# Patient Record
Sex: FEMALE | Race: BLACK OR AFRICAN AMERICAN | NOT HISPANIC OR LATINO
[De-identification: names, ages, dates, MRNs, and addresses within clinical notes are randomized per-mention and may not be internally consistent; named-entity substitution may affect disease eponyms.]

---

## 2017-02-17 ENCOUNTER — MEDICATION RENEWAL (OUTPATIENT)
Age: 62
End: 2017-02-17

## 2017-03-08 ENCOUNTER — MEDICATION RENEWAL (OUTPATIENT)
Age: 62
End: 2017-03-08

## 2017-03-11 ENCOUNTER — APPOINTMENT (OUTPATIENT)
Dept: INTERNAL MEDICINE | Facility: CLINIC | Age: 62
End: 2017-03-11

## 2017-06-12 ENCOUNTER — MEDICATION RENEWAL (OUTPATIENT)
Age: 62
End: 2017-06-12

## 2017-07-15 ENCOUNTER — APPOINTMENT (OUTPATIENT)
Dept: INTERNAL MEDICINE | Facility: CLINIC | Age: 62
End: 2017-07-15

## 2017-07-15 VITALS
WEIGHT: 143 LBS | BODY MASS INDEX: 28.07 KG/M2 | DIASTOLIC BLOOD PRESSURE: 100 MMHG | TEMPERATURE: 98.8 F | HEIGHT: 60 IN | SYSTOLIC BLOOD PRESSURE: 160 MMHG

## 2017-07-17 LAB
ALBUMIN SERPL ELPH-MCNC: 4.6 G/DL
ALP BLD-CCNC: 65 U/L
ALT SERPL-CCNC: 14 U/L
ANION GAP SERPL CALC-SCNC: 18 MMOL/L
AST SERPL-CCNC: 19 U/L
BILIRUB SERPL-MCNC: 0.2 MG/DL
BUN SERPL-MCNC: 11 MG/DL
CALCIUM SERPL-MCNC: 10.1 MG/DL
CHLORIDE SERPL-SCNC: 100 MMOL/L
CO2 SERPL-SCNC: 23 MMOL/L
CREAT SERPL-MCNC: 0.88 MG/DL
GLUCOSE SERPL-MCNC: 94 MG/DL
HBA1C MFR BLD HPLC: 5.8 %
HCV AB SER QL: NONREACTIVE
HCV S/CO RATIO: 0.19 S/CO
POTASSIUM SERPL-SCNC: 4.7 MMOL/L
PROT SERPL-MCNC: 7.9 G/DL
SODIUM SERPL-SCNC: 141 MMOL/L

## 2017-07-23 ENCOUNTER — FORM ENCOUNTER (OUTPATIENT)
Age: 62
End: 2017-07-23

## 2017-07-24 ENCOUNTER — APPOINTMENT (OUTPATIENT)
Dept: MAMMOGRAPHY | Facility: IMAGING CENTER | Age: 62
End: 2017-07-24

## 2017-07-24 ENCOUNTER — OUTPATIENT (OUTPATIENT)
Dept: OUTPATIENT SERVICES | Facility: HOSPITAL | Age: 62
LOS: 1 days | End: 2017-07-24
Payer: MEDICARE

## 2017-07-24 DIAGNOSIS — Z00.00 ENCOUNTER FOR GENERAL ADULT MEDICAL EXAMINATION WITHOUT ABNORMAL FINDINGS: ICD-10-CM

## 2017-07-24 PROCEDURE — 77063 BREAST TOMOSYNTHESIS BI: CPT

## 2017-07-24 PROCEDURE — 77067 SCR MAMMO BI INCL CAD: CPT

## 2017-07-26 ENCOUNTER — FORM ENCOUNTER (OUTPATIENT)
Age: 62
End: 2017-07-26

## 2017-07-27 ENCOUNTER — APPOINTMENT (OUTPATIENT)
Dept: MAMMOGRAPHY | Facility: IMAGING CENTER | Age: 62
End: 2017-07-27
Payer: MEDICARE

## 2017-07-27 ENCOUNTER — OUTPATIENT (OUTPATIENT)
Dept: OUTPATIENT SERVICES | Facility: HOSPITAL | Age: 62
LOS: 1 days | End: 2017-07-27
Payer: MEDICARE

## 2017-07-27 DIAGNOSIS — R92.8 OTHER ABNORMAL AND INCONCLUSIVE FINDINGS ON DIAGNOSTIC IMAGING OF BREAST: ICD-10-CM

## 2017-07-27 PROCEDURE — G0279: CPT

## 2017-07-27 PROCEDURE — G0279: CPT | Mod: 26

## 2017-07-27 PROCEDURE — G0206: CPT | Mod: 26,RT

## 2017-07-27 PROCEDURE — 77065 DX MAMMO INCL CAD UNI: CPT

## 2017-09-19 ENCOUNTER — RX RENEWAL (OUTPATIENT)
Age: 62
End: 2017-09-19

## 2017-10-21 ENCOUNTER — APPOINTMENT (OUTPATIENT)
Dept: INTERNAL MEDICINE | Facility: CLINIC | Age: 62
End: 2017-10-21
Payer: MEDICARE

## 2017-10-21 VITALS
HEART RATE: 92 BPM | OXYGEN SATURATION: 96 % | DIASTOLIC BLOOD PRESSURE: 86 MMHG | BODY MASS INDEX: 28.27 KG/M2 | WEIGHT: 144 LBS | HEIGHT: 60 IN | SYSTOLIC BLOOD PRESSURE: 138 MMHG | TEMPERATURE: 98.5 F

## 2017-10-21 DIAGNOSIS — Z82.49 FAMILY HISTORY OF ISCHEMIC HEART DISEASE AND OTHER DISEASES OF THE CIRCULATORY SYSTEM: ICD-10-CM

## 2017-10-21 DIAGNOSIS — Z23 ENCOUNTER FOR IMMUNIZATION: ICD-10-CM

## 2017-10-21 DIAGNOSIS — Z83.49 FAMILY HISTORY OF OTHER ENDOCRINE, NUTRITIONAL AND METABOLIC DISEASES: ICD-10-CM

## 2017-10-21 PROCEDURE — 36415 COLL VENOUS BLD VENIPUNCTURE: CPT

## 2017-10-21 PROCEDURE — G0439: CPT

## 2017-10-21 PROCEDURE — 90688 IIV4 VACCINE SPLT 0.5 ML IM: CPT

## 2017-10-21 PROCEDURE — G0008: CPT

## 2017-10-23 LAB
25(OH)D3 SERPL-MCNC: 44.6 NG/ML
ALBUMIN SERPL ELPH-MCNC: 4.5 G/DL
ALP BLD-CCNC: 63 U/L
ALT SERPL-CCNC: 10 U/L
ANION GAP SERPL CALC-SCNC: 15 MMOL/L
AST SERPL-CCNC: 17 U/L
BASOPHILS # BLD AUTO: 0.02 K/UL
BASOPHILS NFR BLD AUTO: 0.5 %
BILIRUB SERPL-MCNC: 0.3 MG/DL
BUN SERPL-MCNC: 9 MG/DL
CALCIUM SERPL-MCNC: 9.8 MG/DL
CHLORIDE SERPL-SCNC: 104 MMOL/L
CHOLEST SERPL-MCNC: 201 MG/DL
CHOLEST/HDLC SERPL: 2.9 RATIO
CO2 SERPL-SCNC: 25 MMOL/L
CREAT SERPL-MCNC: 0.81 MG/DL
EOSINOPHIL # BLD AUTO: 0.06 K/UL
EOSINOPHIL NFR BLD AUTO: 1.5 %
GLUCOSE SERPL-MCNC: 94 MG/DL
HBA1C MFR BLD HPLC: 5.8 %
HCT VFR BLD CALC: 35.3 %
HDLC SERPL-MCNC: 70 MG/DL
HGB BLD-MCNC: 11.4 G/DL
IMM GRANULOCYTES NFR BLD AUTO: 0 %
LDLC SERPL CALC-MCNC: 111 MG/DL
LYMPHOCYTES # BLD AUTO: 2.01 K/UL
LYMPHOCYTES NFR BLD AUTO: 49 %
MAN DIFF?: NORMAL
MCHC RBC-ENTMCNC: 27.1 PG
MCHC RBC-ENTMCNC: 32.3 GM/DL
MCV RBC AUTO: 83.8 FL
MONOCYTES # BLD AUTO: 0.33 K/UL
MONOCYTES NFR BLD AUTO: 8 %
NEUTROPHILS # BLD AUTO: 1.68 K/UL
NEUTROPHILS NFR BLD AUTO: 41 %
PLATELET # BLD AUTO: 268 K/UL
POTASSIUM SERPL-SCNC: 4.5 MMOL/L
PROT SERPL-MCNC: 7.5 G/DL
RBC # BLD: 4.21 M/UL
RBC # FLD: 13.9 %
SODIUM SERPL-SCNC: 144 MMOL/L
TRIGL SERPL-MCNC: 99 MG/DL
TSH SERPL-ACNC: 2.25 UIU/ML
WBC # FLD AUTO: 4.1 K/UL

## 2017-12-18 ENCOUNTER — RX RENEWAL (OUTPATIENT)
Age: 62
End: 2017-12-18

## 2018-02-20 ENCOUNTER — MEDICATION RENEWAL (OUTPATIENT)
Age: 63
End: 2018-02-20

## 2018-03-15 ENCOUNTER — RX RENEWAL (OUTPATIENT)
Age: 63
End: 2018-03-15

## 2018-03-17 ENCOUNTER — RX RENEWAL (OUTPATIENT)
Age: 63
End: 2018-03-17

## 2018-04-14 ENCOUNTER — APPOINTMENT (OUTPATIENT)
Dept: INTERNAL MEDICINE | Facility: CLINIC | Age: 63
End: 2018-04-14
Payer: MEDICARE

## 2018-04-14 ENCOUNTER — NON-APPOINTMENT (OUTPATIENT)
Age: 63
End: 2018-04-14

## 2018-04-14 VITALS
BODY MASS INDEX: 28.47 KG/M2 | TEMPERATURE: 98.2 F | OXYGEN SATURATION: 98 % | HEART RATE: 84 BPM | WEIGHT: 145 LBS | HEIGHT: 60 IN | DIASTOLIC BLOOD PRESSURE: 84 MMHG | SYSTOLIC BLOOD PRESSURE: 146 MMHG

## 2018-04-14 PROCEDURE — 93000 ELECTROCARDIOGRAM COMPLETE: CPT

## 2018-04-14 PROCEDURE — 36415 COLL VENOUS BLD VENIPUNCTURE: CPT

## 2018-04-14 PROCEDURE — 99214 OFFICE O/P EST MOD 30 MIN: CPT | Mod: 25

## 2018-04-16 LAB — HBA1C MFR BLD HPLC: 5.9 %

## 2018-05-19 ENCOUNTER — RX RENEWAL (OUTPATIENT)
Age: 63
End: 2018-05-19

## 2018-05-21 ENCOUNTER — APPOINTMENT (OUTPATIENT)
Dept: GASTROENTEROLOGY | Facility: CLINIC | Age: 63
End: 2018-05-21
Payer: MEDICARE

## 2018-05-21 VITALS
SYSTOLIC BLOOD PRESSURE: 155 MMHG | BODY MASS INDEX: 28.47 KG/M2 | DIASTOLIC BLOOD PRESSURE: 88 MMHG | HEART RATE: 91 BPM | HEIGHT: 60 IN | WEIGHT: 145 LBS

## 2018-05-21 PROCEDURE — 99204 OFFICE O/P NEW MOD 45 MIN: CPT

## 2018-06-12 ENCOUNTER — RX RENEWAL (OUTPATIENT)
Age: 63
End: 2018-06-12

## 2018-07-28 ENCOUNTER — APPOINTMENT (OUTPATIENT)
Dept: INTERNAL MEDICINE | Facility: CLINIC | Age: 63
End: 2018-07-28

## 2018-07-30 ENCOUNTER — RX RENEWAL (OUTPATIENT)
Age: 63
End: 2018-07-30

## 2018-08-07 ENCOUNTER — FORM ENCOUNTER (OUTPATIENT)
Age: 63
End: 2018-08-07

## 2018-08-08 ENCOUNTER — APPOINTMENT (OUTPATIENT)
Dept: MAMMOGRAPHY | Facility: IMAGING CENTER | Age: 63
End: 2018-08-08
Payer: MEDICARE

## 2018-08-08 ENCOUNTER — OUTPATIENT (OUTPATIENT)
Dept: OUTPATIENT SERVICES | Facility: HOSPITAL | Age: 63
LOS: 1 days | End: 2018-08-08
Payer: MEDICARE

## 2018-08-08 DIAGNOSIS — K21.9 GASTRO-ESOPHAGEAL REFLUX DISEASE WITHOUT ESOPHAGITIS: ICD-10-CM

## 2018-08-08 PROCEDURE — 77067 SCR MAMMO BI INCL CAD: CPT | Mod: 26

## 2018-08-08 PROCEDURE — 77063 BREAST TOMOSYNTHESIS BI: CPT | Mod: 26

## 2018-08-08 PROCEDURE — 77063 BREAST TOMOSYNTHESIS BI: CPT

## 2018-08-08 PROCEDURE — 77067 SCR MAMMO BI INCL CAD: CPT

## 2018-08-18 ENCOUNTER — APPOINTMENT (OUTPATIENT)
Dept: INTERNAL MEDICINE | Facility: CLINIC | Age: 63
End: 2018-08-18
Payer: MEDICARE

## 2018-08-18 VITALS
DIASTOLIC BLOOD PRESSURE: 84 MMHG | WEIGHT: 140 LBS | TEMPERATURE: 98.5 F | BODY MASS INDEX: 27.48 KG/M2 | HEIGHT: 60 IN | OXYGEN SATURATION: 98 % | SYSTOLIC BLOOD PRESSURE: 140 MMHG | HEART RATE: 92 BPM

## 2018-08-18 DIAGNOSIS — Z78.9 OTHER SPECIFIED HEALTH STATUS: ICD-10-CM

## 2018-08-18 PROCEDURE — 36415 COLL VENOUS BLD VENIPUNCTURE: CPT

## 2018-08-18 PROCEDURE — 99214 OFFICE O/P EST MOD 30 MIN: CPT | Mod: 25

## 2018-08-18 NOTE — HISTORY OF PRESENT ILLNESS
[FreeTextEntry1] : f/u DM , HTN , HL \par \par pt is doing well \par exercises daily - lost 5 ibs \par her A1C 5.6, \par BP well controlled\par \par no chest pain or SOB \par compliant with meds \par \par mammo - negative saw GI - EGD deferred for now\par - GERD well controlled

## 2018-08-18 NOTE — REVIEW OF SYSTEMS
[Recent Change In Weight] : ~T recent weight change [Heartburn] : heartburn [Negative] : Psychiatric

## 2018-08-18 NOTE — PHYSICAL EXAM
[No Acute Distress] : no acute distress [Well Nourished] : well nourished [Well Developed] : well developed [Normal Sclera/Conjunctiva] : normal sclera/conjunctiva [PERRL] : pupils equal round and reactive to light [EOMI] : extraocular movements intact [Normal Outer Ear/Nose] : the outer ears and nose were normal in appearance [Normal Oropharynx] : the oropharynx was normal [No JVD] : no jugular venous distention [Supple] : supple [No Lymphadenopathy] : no lymphadenopathy [No Respiratory Distress] : no respiratory distress  [Clear to Auscultation] : lungs were clear to auscultation bilaterally [No Accessory Muscle Use] : no accessory muscle use [Normal Rate] : normal rate  [Regular Rhythm] : with a regular rhythm [Normal S1, S2] : normal S1 and S2 [Soft] : abdomen soft [Non Tender] : non-tender [Non-distended] : non-distended [No HSM] : no HSM [Normal Bowel Sounds] : normal bowel sounds [Normal Supraclavicular Nodes] : no supraclavicular lymphadenopathy [Normal Posterior Cervical Nodes] : no posterior cervical lymphadenopathy [Normal Anterior Cervical Nodes] : no anterior cervical lymphadenopathy

## 2018-08-18 NOTE — ASSESSMENT
[FreeTextEntry1] : # DM \par - well controlled\par - ct meds \par \par # HL\par - \par - no AE \par \par #  HTN \par - stable\par - ct meds \par \par

## 2018-08-20 ENCOUNTER — OTHER (OUTPATIENT)
Age: 63
End: 2018-08-20

## 2018-08-20 LAB
ALBUMIN SERPL ELPH-MCNC: 5.1 G/DL
ALP BLD-CCNC: 66 U/L
ALT SERPL-CCNC: 9 U/L
ANION GAP SERPL CALC-SCNC: 17 MMOL/L
AST SERPL-CCNC: 18 U/L
BILIRUB SERPL-MCNC: 0.4 MG/DL
BUN SERPL-MCNC: 10 MG/DL
CALCIUM SERPL-MCNC: 9.9 MG/DL
CHLORIDE SERPL-SCNC: 98 MMOL/L
CHOLEST SERPL-MCNC: 200 MG/DL
CHOLEST/HDLC SERPL: 2.9 RATIO
CO2 SERPL-SCNC: 26 MMOL/L
CREAT SERPL-MCNC: 0.83 MG/DL
GLUCOSE SERPL-MCNC: 84 MG/DL
HBA1C MFR BLD HPLC: 6 %
HDLC SERPL-MCNC: 68 MG/DL
LDLC SERPL CALC-MCNC: 111 MG/DL
POTASSIUM SERPL-SCNC: 4.6 MMOL/L
PROT SERPL-MCNC: 7.6 G/DL
SODIUM SERPL-SCNC: 141 MMOL/L
TRIGL SERPL-MCNC: 107 MG/DL

## 2018-08-31 ENCOUNTER — RX RENEWAL (OUTPATIENT)
Age: 63
End: 2018-08-31

## 2018-09-08 ENCOUNTER — RX RENEWAL (OUTPATIENT)
Age: 63
End: 2018-09-08

## 2018-09-10 ENCOUNTER — MEDICATION RENEWAL (OUTPATIENT)
Age: 63
End: 2018-09-10

## 2018-10-02 ENCOUNTER — MEDICATION RENEWAL (OUTPATIENT)
Age: 63
End: 2018-10-02

## 2018-11-05 ENCOUNTER — APPOINTMENT (OUTPATIENT)
Dept: INTERNAL MEDICINE | Facility: CLINIC | Age: 63
End: 2018-11-05
Payer: MEDICARE

## 2018-11-05 PROCEDURE — G0008: CPT

## 2018-11-05 PROCEDURE — 90686 IIV4 VACC NO PRSV 0.5 ML IM: CPT

## 2018-11-15 ENCOUNTER — MEDICATION RENEWAL (OUTPATIENT)
Age: 63
End: 2018-11-15

## 2019-02-11 ENCOUNTER — MEDICATION RENEWAL (OUTPATIENT)
Age: 64
End: 2019-02-11

## 2019-02-12 ENCOUNTER — APPOINTMENT (OUTPATIENT)
Dept: INTERNAL MEDICINE | Facility: CLINIC | Age: 64
End: 2019-02-12
Payer: MEDICARE

## 2019-02-12 VITALS
BODY MASS INDEX: 27.48 KG/M2 | SYSTOLIC BLOOD PRESSURE: 140 MMHG | OXYGEN SATURATION: 98 % | WEIGHT: 140 LBS | HEIGHT: 60 IN | HEART RATE: 83 BPM | TEMPERATURE: 98.5 F | DIASTOLIC BLOOD PRESSURE: 82 MMHG

## 2019-02-12 DIAGNOSIS — Z83.3 FAMILY HISTORY OF DIABETES MELLITUS: ICD-10-CM

## 2019-02-12 DIAGNOSIS — Z84.1 FAMILY HISTORY OF DISORDERS OF KIDNEY AND URETER: ICD-10-CM

## 2019-02-12 PROCEDURE — 36415 COLL VENOUS BLD VENIPUNCTURE: CPT

## 2019-02-12 PROCEDURE — G0439: CPT

## 2019-02-12 NOTE — ASSESSMENT
[FreeTextEntry1] : # Wellness check \par \par - diet / exercise / home safety discussed \par - independent w/ ADL /IADL , no assistive devices , no falls\par - UTD colonoscopy /  mammo, need [pap \par -utd w/ opth\par - check lipid and A1C \par - immunization - needs shingle vaccine - will get at pharmacy \par \par \par # neck / back pain \par - PT \par - not interested in epidural shot

## 2019-02-12 NOTE — HEALTH RISK ASSESSMENT
[Good] : ~his/her~  mood as  good [] : No [No falls in past year] : Patient reported no falls in the past year [0] : 1) Little interest or pleasure doing things: Not at all (0) [Change in mental status noted] : No change in mental status noted [None] : None [Alone] : lives alone [Retired] : retired [Single] : single [Sexually Active] : not sexually active [Feels Safe at Home] : Feels safe at home [Fully functional (bathing, dressing, toileting, transferring, walking, feeding)] : Fully functional (bathing, dressing, toileting, transferring, walking, feeding) [Fully functional (using the telephone, shopping, preparing meals, housekeeping, doing laundry, using] : Fully functional and needs no help or supervision to perform IADLs (using the telephone, shopping, preparing meals, housekeeping, doing laundry, using transportation, managing medications and managing finances) [Reports changes in hearing] : Reports no changes in hearing [Reports changes in vision] : Reports changes in vision [Reports changes in dental health] : Reports changes in dental health [Smoke Detector] : smoke detector [Carbon Monoxide Detector] : carbon monoxide detector [Safety elements used in home] : safety elements used in home [Seat Belt] :  uses seat belt [MammogramDate] : 8/18 [PapSmearDate] : need [ColonoscopyDate] : 2010 [Discussed at today's visit] : Advance Directives Discussed at today's visit

## 2019-02-12 NOTE — HISTORY OF PRESENT ILLNESS
[FreeTextEntry1] : wellness check \par her main issue is the ongoing back and neck pain , she does have herniated discs impinging on the nerve roots ,no weakness in UE/ LE , has had good response to PT in the past \par other provider - DR MORENO- GI

## 2019-02-12 NOTE — PHYSICAL EXAM
[No Acute Distress] : no acute distress [Well Nourished] : well nourished [Normal Sclera/Conjunctiva] : normal sclera/conjunctiva [PERRL] : pupils equal round and reactive to light [EOMI] : extraocular movements intact [Normal Outer Ear/Nose] : the outer ears and nose were normal in appearance [Normal Oropharynx] : the oropharynx was normal [No JVD] : no jugular venous distention [Supple] : supple [No Lymphadenopathy] : no lymphadenopathy [Thyroid Normal, No Nodules] : the thyroid was normal and there were no nodules present [No Respiratory Distress] : no respiratory distress  [Clear to Auscultation] : lungs were clear to auscultation bilaterally [No Accessory Muscle Use] : no accessory muscle use [Normal Rate] : normal rate  [Regular Rhythm] : with a regular rhythm [Normal S1, S2] : normal S1 and S2 [No Murmur] : no murmur heard [No Varicosities] : no varicosities [No Edema] : there was no peripheral edema [No Extremity Clubbing/Cyanosis] : no extremity clubbing/cyanosis [Soft] : abdomen soft [Non Tender] : non-tender [No HSM] : no HSM [Normal Bowel Sounds] : normal bowel sounds [Normal Supraclavicular Nodes] : no supraclavicular lymphadenopathy [Normal Posterior Cervical Nodes] : no posterior cervical lymphadenopathy [Normal Anterior Cervical Nodes] : no anterior cervical lymphadenopathy [No CVA Tenderness] : no CVA  tenderness [No Spinal Tenderness] : no spinal tenderness [No Joint Swelling] : no joint swelling [Grossly Normal Strength/Tone] : grossly normal strength/tone [No Rash] : no rash [Normal Gait] : normal gait [Coordination Grossly Intact] : coordination grossly intact [Speech Grossly Normal] : speech grossly normal [Memory Grossly Normal] : memory grossly normal [Normal Mood] : the mood was normal [Normal Insight/Judgement] : insight and judgment were intact

## 2019-02-13 LAB
25(OH)D3 SERPL-MCNC: 49.8 NG/ML
ALBUMIN SERPL ELPH-MCNC: 5.1 G/DL
ALP BLD-CCNC: 73 U/L
ALT SERPL-CCNC: 10 U/L
ANION GAP SERPL CALC-SCNC: 14 MMOL/L
AST SERPL-CCNC: 16 U/L
BILIRUB SERPL-MCNC: 0.4 MG/DL
BUN SERPL-MCNC: 10 MG/DL
CALCIUM SERPL-MCNC: 10.3 MG/DL
CHLORIDE SERPL-SCNC: 102 MMOL/L
CHOLEST SERPL-MCNC: 222 MG/DL
CHOLEST/HDLC SERPL: 3.1 RATIO
CO2 SERPL-SCNC: 26 MMOL/L
CREAT SERPL-MCNC: 0.79 MG/DL
GLUCOSE SERPL-MCNC: 80 MG/DL
HBA1C MFR BLD HPLC: 6 %
HDLC SERPL-MCNC: 72 MG/DL
LDLC SERPL CALC-MCNC: 130 MG/DL
POTASSIUM SERPL-SCNC: 4.8 MMOL/L
PROT SERPL-MCNC: 7.8 G/DL
SODIUM SERPL-SCNC: 142 MMOL/L
TRIGL SERPL-MCNC: 102 MG/DL

## 2019-02-21 ENCOUNTER — MEDICATION RENEWAL (OUTPATIENT)
Age: 64
End: 2019-02-21

## 2019-04-30 ENCOUNTER — MEDICATION RENEWAL (OUTPATIENT)
Age: 64
End: 2019-04-30

## 2019-06-29 ENCOUNTER — APPOINTMENT (OUTPATIENT)
Dept: INTERNAL MEDICINE | Facility: CLINIC | Age: 64
End: 2019-06-29
Payer: MEDICARE

## 2019-06-29 VITALS
HEART RATE: 86 BPM | WEIGHT: 145 LBS | BODY MASS INDEX: 28.47 KG/M2 | OXYGEN SATURATION: 96 % | TEMPERATURE: 98.5 F | HEIGHT: 60 IN | SYSTOLIC BLOOD PRESSURE: 134 MMHG | DIASTOLIC BLOOD PRESSURE: 82 MMHG

## 2019-06-29 DIAGNOSIS — M54.16 RADICULOPATHY, LUMBAR REGION: ICD-10-CM

## 2019-06-29 PROCEDURE — 36415 COLL VENOUS BLD VENIPUNCTURE: CPT

## 2019-06-29 PROCEDURE — 99214 OFFICE O/P EST MOD 30 MIN: CPT | Mod: 25

## 2019-06-29 NOTE — PHYSICAL EXAM
[No Acute Distress] : no acute distress [Well Nourished] : well nourished [Well-Appearing] : well-appearing [Well Developed] : well developed [Normal Sclera/Conjunctiva] : normal sclera/conjunctiva [PERRL] : pupils equal round and reactive to light [Normal Outer Ear/Nose] : the outer ears and nose were normal in appearance [Normal Oropharynx] : the oropharynx was normal [No JVD] : no jugular venous distention [Supple] : supple [No Lymphadenopathy] : no lymphadenopathy [No Respiratory Distress] : no respiratory distress  [Clear to Auscultation] : lungs were clear to auscultation bilaterally [Normal Rate] : normal rate  [No Accessory Muscle Use] : no accessory muscle use [Normal S1, S2] : normal S1 and S2 [Regular Rhythm] : with a regular rhythm [Soft] : abdomen soft [No HSM] : no HSM [Non Tender] : non-tender [Normal Bowel Sounds] : normal bowel sounds [Normal Supraclavicular Nodes] : no supraclavicular lymphadenopathy [Normal Anterior Cervical Nodes] : no anterior cervical lymphadenopathy

## 2019-06-29 NOTE — ASSESSMENT
[FreeTextEntry1] : 1) DM\par - well controlled\par - last A1C 6 \par - optho- [facility] will get me the report'\par - refuses pneumovax \par \par 2) HTN \par - stable\par - ct meds \par \par 3) HL \par - not on statin\par - last \par - on zetia , agrees to research statins before she lets me write an RX \par \par 4) Needs mammo / CRC screening \par - gyn follow up

## 2019-06-29 NOTE — HISTORY OF PRESENT ILLNESS
[FreeTextEntry1] : f/u HTN / DM / HL \par \par \par  no complaints\par taking meds \par no AE\par Last A1C 6\par LDL - 130 - reluctant to start statin

## 2019-07-02 LAB
BASOPHILS # BLD AUTO: 0.04 K/UL
BASOPHILS NFR BLD AUTO: 0.9 %
CREAT SERPL-MCNC: 0.81 MG/DL
EOSINOPHIL # BLD AUTO: 0.06 K/UL
EOSINOPHIL NFR BLD AUTO: 1.4 %
ESTIMATED AVERAGE GLUCOSE: 126 MG/DL
HBA1C MFR BLD HPLC: 6 %
HCT VFR BLD CALC: 38.5 %
HGB BLD-MCNC: 12 G/DL
IMM GRANULOCYTES NFR BLD AUTO: 0 %
LYMPHOCYTES # BLD AUTO: 2.1 K/UL
LYMPHOCYTES NFR BLD AUTO: 49.3 %
MAN DIFF?: NORMAL
MCHC RBC-ENTMCNC: 27.2 PG
MCHC RBC-ENTMCNC: 31.2 GM/DL
MCV RBC AUTO: 87.3 FL
MONOCYTES # BLD AUTO: 0.39 K/UL
MONOCYTES NFR BLD AUTO: 9.2 %
NEUTROPHILS # BLD AUTO: 1.67 K/UL
NEUTROPHILS NFR BLD AUTO: 39.2 %
PLATELET # BLD AUTO: 287 K/UL
RBC # BLD: 4.41 M/UL
RBC # FLD: 13.7 %
WBC # FLD AUTO: 4.26 K/UL

## 2019-08-08 ENCOUNTER — FORM ENCOUNTER (OUTPATIENT)
Age: 64
End: 2019-08-08

## 2019-08-09 ENCOUNTER — APPOINTMENT (OUTPATIENT)
Dept: MAMMOGRAPHY | Facility: IMAGING CENTER | Age: 64
End: 2019-08-09
Payer: MEDICARE

## 2019-08-09 ENCOUNTER — OUTPATIENT (OUTPATIENT)
Dept: OUTPATIENT SERVICES | Facility: HOSPITAL | Age: 64
LOS: 1 days | End: 2019-08-09
Payer: MEDICARE

## 2019-08-09 DIAGNOSIS — Z00.00 ENCOUNTER FOR GENERAL ADULT MEDICAL EXAMINATION WITHOUT ABNORMAL FINDINGS: ICD-10-CM

## 2019-08-09 PROCEDURE — 77063 BREAST TOMOSYNTHESIS BI: CPT

## 2019-08-09 PROCEDURE — 77067 SCR MAMMO BI INCL CAD: CPT

## 2019-08-09 PROCEDURE — 77067 SCR MAMMO BI INCL CAD: CPT | Mod: 26

## 2019-08-09 PROCEDURE — 77063 BREAST TOMOSYNTHESIS BI: CPT | Mod: 26

## 2019-11-23 ENCOUNTER — APPOINTMENT (OUTPATIENT)
Dept: INTERNAL MEDICINE | Facility: CLINIC | Age: 64
End: 2019-11-23
Payer: MEDICARE

## 2019-11-23 VITALS
HEIGHT: 60 IN | HEART RATE: 102 BPM | OXYGEN SATURATION: 98 % | TEMPERATURE: 98.5 F | WEIGHT: 142 LBS | BODY MASS INDEX: 27.88 KG/M2 | SYSTOLIC BLOOD PRESSURE: 164 MMHG | DIASTOLIC BLOOD PRESSURE: 80 MMHG

## 2019-11-23 DIAGNOSIS — K21.9 GASTRO-ESOPHAGEAL REFLUX DISEASE W/OUT ESOPHAGITIS: ICD-10-CM

## 2019-11-23 PROCEDURE — 99214 OFFICE O/P EST MOD 30 MIN: CPT | Mod: 25

## 2019-11-23 PROCEDURE — 36415 COLL VENOUS BLD VENIPUNCTURE: CPT

## 2019-11-23 RX ORDER — CYCLOBENZAPRINE HYDROCHLORIDE 5 MG/1
5 TABLET, FILM COATED ORAL
Qty: 30 | Refills: 0 | Status: DISCONTINUED | COMMUNITY
Start: 2019-09-25

## 2019-11-23 RX ORDER — RANITIDINE HYDROCHLORIDE 300 MG/1
300 CAPSULE ORAL
Qty: 30 | Refills: 1 | Status: DISCONTINUED | COMMUNITY
Start: 2018-04-14 | End: 2019-11-23

## 2019-11-23 RX ORDER — EZETIMIBE 10 MG/1
10 TABLET ORAL DAILY
Qty: 1 | Refills: 1 | Status: DISCONTINUED | COMMUNITY
Start: 2018-08-18 | End: 2019-11-23

## 2019-11-23 NOTE — HISTORY OF PRESENT ILLNESS
[FreeTextEntry1] : f/u DM / HTN / HL \par \par taking all meds \par no AE \par the  home BP records are all WNL \par \par she reports episodic GERD symptoms , was on ranitidine but stopped bc recall \par no dysphagia, weight loss

## 2019-11-23 NOTE — ASSESSMENT
[FreeTextEntry1] : 1) DM \par - ct meds \par - low carb diet \par - check A1C \par - optho- UTD \par \par 2) HTN \par - ct meds \par -(+) white coat effect\par - ct home monitoring \par - check renal fx \par \par 3) HL \par - ct meds \par - low fat diet\par - check lipid \par \par 4) GERD \par - stable\par - - lifestyle changes stressed - will refer to GI bc age , eval for EGD

## 2019-11-26 LAB
ALBUMIN SERPL ELPH-MCNC: 4.7 G/DL
ALP BLD-CCNC: 62 U/L
ALT SERPL-CCNC: 6 U/L
ANION GAP SERPL CALC-SCNC: 14 MMOL/L
AST SERPL-CCNC: 12 U/L
BILIRUB SERPL-MCNC: 0.3 MG/DL
BUN SERPL-MCNC: 10 MG/DL
CALCIUM SERPL-MCNC: 10.1 MG/DL
CHLORIDE SERPL-SCNC: 103 MMOL/L
CHOLEST SERPL-MCNC: 195 MG/DL
CHOLEST/HDLC SERPL: 2.9 RATIO
CO2 SERPL-SCNC: 24 MMOL/L
CREAT SERPL-MCNC: 0.68 MG/DL
ESTIMATED AVERAGE GLUCOSE: 123 MG/DL
GLUCOSE SERPL-MCNC: 86 MG/DL
HBA1C MFR BLD HPLC: 5.9 %
HDLC SERPL-MCNC: 68 MG/DL
LDLC SERPL CALC-MCNC: 113 MG/DL
POTASSIUM SERPL-SCNC: 4.5 MMOL/L
PROT SERPL-MCNC: 7.3 G/DL
SODIUM SERPL-SCNC: 141 MMOL/L
TRIGL SERPL-MCNC: 71 MG/DL

## 2020-05-28 ENCOUNTER — APPOINTMENT (OUTPATIENT)
Dept: INTERNAL MEDICINE | Facility: CLINIC | Age: 65
End: 2020-05-28

## 2020-07-15 RX ORDER — LANCETS 33 GAUGE
EACH MISCELLANEOUS
Qty: 100 | Refills: 1 | Status: ACTIVE | COMMUNITY
Start: 2020-07-15 | End: 1900-01-01

## 2020-08-22 ENCOUNTER — APPOINTMENT (OUTPATIENT)
Dept: INTERNAL MEDICINE | Facility: CLINIC | Age: 65
End: 2020-08-22
Payer: MEDICARE

## 2020-08-22 VITALS
TEMPERATURE: 98.4 F | SYSTOLIC BLOOD PRESSURE: 152 MMHG | HEIGHT: 60 IN | BODY MASS INDEX: 28.47 KG/M2 | HEART RATE: 100 BPM | WEIGHT: 145 LBS | OXYGEN SATURATION: 98 % | DIASTOLIC BLOOD PRESSURE: 86 MMHG

## 2020-08-22 PROCEDURE — 99214 OFFICE O/P EST MOD 30 MIN: CPT | Mod: 25

## 2020-08-22 PROCEDURE — 36415 COLL VENOUS BLD VENIPUNCTURE: CPT

## 2020-08-22 NOTE — HISTORY OF PRESENT ILLNESS
[FreeTextEntry1] : f/u DM / HTN / HL \par \par taking all meds \par no AE \par she has been checking BP (brought log )\par BP is well controlled, mostly under 130/ 80 \par no chest pain or SOB

## 2020-08-22 NOTE — ASSESSMENT
[FreeTextEntry1] : 1) DM \par - stable by log reading\par - ct meds \par - optho\par  check labs\par \par 2) HTN \par - stable by home readings \par -possible white coat effect \par \par 3) HL\par - ct meds \par check lipid and ALT \par \par  mammo ordered

## 2020-08-25 LAB
ALBUMIN SERPL ELPH-MCNC: 5 G/DL
ALP BLD-CCNC: 76 U/L
ALT SERPL-CCNC: 12 U/L
ANION GAP SERPL CALC-SCNC: 16 MMOL/L
AST SERPL-CCNC: 19 U/L
BILIRUB SERPL-MCNC: 0.2 MG/DL
BUN SERPL-MCNC: 8 MG/DL
CALCIUM SERPL-MCNC: 9.7 MG/DL
CHLORIDE SERPL-SCNC: 104 MMOL/L
CHOLEST SERPL-MCNC: 200 MG/DL
CHOLEST/HDLC SERPL: 3.1 RATIO
CO2 SERPL-SCNC: 21 MMOL/L
CREAT SERPL-MCNC: 0.74 MG/DL
CREAT SPEC-SCNC: 128 MG/DL
ESTIMATED AVERAGE GLUCOSE: 126 MG/DL
GLUCOSE SERPL-MCNC: 99 MG/DL
HBA1C MFR BLD HPLC: 6 %
HDLC SERPL-MCNC: 65 MG/DL
LDLC SERPL CALC-MCNC: 116 MG/DL
MICROALBUMIN 24H UR DL<=1MG/L-MCNC: <1.2 MG/DL
MICROALBUMIN/CREAT 24H UR-RTO: NORMAL MG/G
POTASSIUM SERPL-SCNC: 4.3 MMOL/L
PROT SERPL-MCNC: 7.4 G/DL
SODIUM SERPL-SCNC: 141 MMOL/L
TRIGL SERPL-MCNC: 99 MG/DL

## 2020-08-28 ENCOUNTER — APPOINTMENT (OUTPATIENT)
Dept: MAMMOGRAPHY | Facility: IMAGING CENTER | Age: 65
End: 2020-08-28
Payer: MEDICARE

## 2020-08-28 ENCOUNTER — OUTPATIENT (OUTPATIENT)
Dept: OUTPATIENT SERVICES | Facility: HOSPITAL | Age: 65
LOS: 1 days | End: 2020-08-28
Payer: MEDICARE

## 2020-08-28 ENCOUNTER — RESULT REVIEW (OUTPATIENT)
Age: 65
End: 2020-08-28

## 2020-08-28 DIAGNOSIS — I10 ESSENTIAL (PRIMARY) HYPERTENSION: ICD-10-CM

## 2020-08-28 PROCEDURE — 77063 BREAST TOMOSYNTHESIS BI: CPT

## 2020-08-28 PROCEDURE — 77067 SCR MAMMO BI INCL CAD: CPT

## 2020-08-28 PROCEDURE — 77067 SCR MAMMO BI INCL CAD: CPT | Mod: 26

## 2020-08-28 PROCEDURE — 77063 BREAST TOMOSYNTHESIS BI: CPT | Mod: 26

## 2020-10-09 ENCOUNTER — RX RENEWAL (OUTPATIENT)
Age: 65
End: 2020-10-09

## 2021-04-02 ENCOUNTER — RX RENEWAL (OUTPATIENT)
Age: 66
End: 2021-04-02

## 2021-04-03 ENCOUNTER — APPOINTMENT (OUTPATIENT)
Dept: INTERNAL MEDICINE | Facility: CLINIC | Age: 66
End: 2021-04-03

## 2021-04-27 ENCOUNTER — RX RENEWAL (OUTPATIENT)
Age: 66
End: 2021-04-27

## 2021-06-12 ENCOUNTER — NON-APPOINTMENT (OUTPATIENT)
Age: 66
End: 2021-06-12

## 2021-06-12 ENCOUNTER — APPOINTMENT (OUTPATIENT)
Dept: INTERNAL MEDICINE | Facility: CLINIC | Age: 66
End: 2021-06-12
Payer: MEDICARE

## 2021-06-12 VITALS
DIASTOLIC BLOOD PRESSURE: 80 MMHG | WEIGHT: 144 LBS | OXYGEN SATURATION: 94 % | HEIGHT: 60 IN | HEART RATE: 95 BPM | SYSTOLIC BLOOD PRESSURE: 140 MMHG | TEMPERATURE: 98.4 F | BODY MASS INDEX: 28.27 KG/M2

## 2021-06-12 PROCEDURE — 99214 OFFICE O/P EST MOD 30 MIN: CPT | Mod: 25

## 2021-06-12 PROCEDURE — 36415 COLL VENOUS BLD VENIPUNCTURE: CPT

## 2021-06-12 NOTE — ASSESSMENT
[FreeTextEntry1] : 1) DM \par - well controlled\par - ct metformin \par - started biking \par - check microalb\par \par 2) HTN \par - stable\par - ct meds \par \par 3) HL\par - stable\par -ct meds \par - check lipid \par \par 4)needs mammo / CRC screening

## 2021-06-12 NOTE — PHYSICAL EXAM
[No Edema] : there was no peripheral edema [Normal] : no joint swelling and grossly normal strength and tone

## 2021-06-12 NOTE — HISTORY OF PRESENT ILLNESS
[FreeTextEntry1] : f/u DM/ HTN / HL \par \par \par taking meds as adv \par no AE\par UTD opth\par \par LABS 8/22/2020\par a1c 6 \par Cr 0.7\par \par \par  mammo - neg 8/2020 \par \par pt has had Covid

## 2021-06-15 LAB
ALBUMIN SERPL ELPH-MCNC: 5 G/DL
ALP BLD-CCNC: 78 U/L
ALT SERPL-CCNC: 14 U/L
ANION GAP SERPL CALC-SCNC: 12 MMOL/L
AST SERPL-CCNC: 19 U/L
BILIRUB SERPL-MCNC: 0.2 MG/DL
BUN SERPL-MCNC: 10 MG/DL
CALCIUM SERPL-MCNC: 10.1 MG/DL
CHLORIDE SERPL-SCNC: 103 MMOL/L
CHOLEST SERPL-MCNC: 189 MG/DL
CO2 SERPL-SCNC: 26 MMOL/L
CREAT SERPL-MCNC: 0.75 MG/DL
CREAT SPEC-SCNC: 213 MG/DL
ESTIMATED AVERAGE GLUCOSE: 123 MG/DL
GLUCOSE SERPL-MCNC: 90 MG/DL
HBA1C MFR BLD HPLC: 5.9 %
HDLC SERPL-MCNC: 57 MG/DL
LDLC SERPL CALC-MCNC: 113 MG/DL
MICROALBUMIN 24H UR DL<=1MG/L-MCNC: 1.2 MG/DL
MICROALBUMIN/CREAT 24H UR-RTO: 6 MG/G
NONHDLC SERPL-MCNC: 132 MG/DL
POTASSIUM SERPL-SCNC: 4.7 MMOL/L
PROT SERPL-MCNC: 7.6 G/DL
SODIUM SERPL-SCNC: 141 MMOL/L
TRIGL SERPL-MCNC: 95 MG/DL

## 2021-08-31 ENCOUNTER — RESULT REVIEW (OUTPATIENT)
Age: 66
End: 2021-08-31

## 2021-08-31 ENCOUNTER — OUTPATIENT (OUTPATIENT)
Dept: OUTPATIENT SERVICES | Facility: HOSPITAL | Age: 66
LOS: 1 days | End: 2021-08-31
Payer: MEDICARE

## 2021-08-31 ENCOUNTER — APPOINTMENT (OUTPATIENT)
Dept: MAMMOGRAPHY | Facility: IMAGING CENTER | Age: 66
End: 2021-08-31
Payer: MEDICARE

## 2021-08-31 DIAGNOSIS — I10 ESSENTIAL (PRIMARY) HYPERTENSION: ICD-10-CM

## 2021-08-31 PROCEDURE — 77067 SCR MAMMO BI INCL CAD: CPT | Mod: 26

## 2021-08-31 PROCEDURE — 77063 BREAST TOMOSYNTHESIS BI: CPT | Mod: 26

## 2021-08-31 PROCEDURE — 77067 SCR MAMMO BI INCL CAD: CPT

## 2021-08-31 PROCEDURE — 77063 BREAST TOMOSYNTHESIS BI: CPT

## 2021-10-04 ENCOUNTER — APPOINTMENT (OUTPATIENT)
Dept: INTERNAL MEDICINE | Facility: CLINIC | Age: 66
End: 2021-10-04
Payer: MEDICARE

## 2021-10-04 PROCEDURE — G0008: CPT

## 2021-10-04 PROCEDURE — 90662 IIV NO PRSV INCREASED AG IM: CPT

## 2021-12-18 ENCOUNTER — APPOINTMENT (OUTPATIENT)
Dept: INTERNAL MEDICINE | Facility: CLINIC | Age: 66
End: 2021-12-18
Payer: MEDICARE

## 2021-12-18 VITALS
TEMPERATURE: 98.1 F | SYSTOLIC BLOOD PRESSURE: 146 MMHG | BODY MASS INDEX: 28.12 KG/M2 | HEART RATE: 97 BPM | WEIGHT: 144 LBS | DIASTOLIC BLOOD PRESSURE: 82 MMHG | OXYGEN SATURATION: 97 %

## 2021-12-18 PROCEDURE — 99214 OFFICE O/P EST MOD 30 MIN: CPT

## 2021-12-18 NOTE — PLAN
[FreeTextEntry1] : 1) DM\par - stable\par - ct meds \par - check A1C \par - UTD optho\par \par \par 2) HTN \par - ct med\par \par 3) HL\par - ct meds \par - check lipid

## 2021-12-18 NOTE — PHYSICAL EXAM
[Normal Sclera/Conjunctiva] : normal sclera/conjunctiva [Normal Outer Ear/Nose] : the outer ears and nose were normal in appearance [No Edema] : there was no peripheral edema [Normal] : soft, non-tender, non-distended, no masses palpated, no HSM and normal bowel sounds

## 2021-12-18 NOTE — HISTORY OF PRESENT ILLNESS
[FreeTextEntry1] : here for f.u \par \par DM/ HTN / HL \par \par taking med as adv\par no AE  \par no chest pian or SOB \par \par Labs \par A1C 5.9 \par \par \par \par HAS had colonoscopy and EGD \par Mammo- neg

## 2021-12-21 LAB
ESTIMATED AVERAGE GLUCOSE: 123 MG/DL
HBA1C MFR BLD HPLC: 5.9 %

## 2021-12-22 ENCOUNTER — TRANSCRIPTION ENCOUNTER (OUTPATIENT)
Age: 66
End: 2021-12-22

## 2022-01-25 ENCOUNTER — TRANSCRIPTION ENCOUNTER (OUTPATIENT)
Age: 67
End: 2022-01-25

## 2022-01-25 ENCOUNTER — RX RENEWAL (OUTPATIENT)
Age: 67
End: 2022-01-25

## 2022-01-26 ENCOUNTER — TRANSCRIPTION ENCOUNTER (OUTPATIENT)
Age: 67
End: 2022-01-26

## 2022-02-22 ENCOUNTER — TRANSCRIPTION ENCOUNTER (OUTPATIENT)
Age: 67
End: 2022-02-22

## 2022-03-11 RX ORDER — BLOOD-GLUCOSE METER
W/DEVICE EACH MISCELLANEOUS
Qty: 1 | Refills: 0 | Status: ACTIVE | COMMUNITY
Start: 2022-03-11 | End: 1900-01-01

## 2022-03-12 ENCOUNTER — TRANSCRIPTION ENCOUNTER (OUTPATIENT)
Age: 67
End: 2022-03-12

## 2022-03-12 RX ORDER — BLOOD-GLUCOSE METER
W/DEVICE EACH MISCELLANEOUS
Qty: 1 | Refills: 0 | Status: ACTIVE | COMMUNITY
Start: 2020-07-15 | End: 1900-01-01

## 2022-06-07 ENCOUNTER — TRANSCRIPTION ENCOUNTER (OUTPATIENT)
Age: 67
End: 2022-06-07

## 2022-06-08 ENCOUNTER — RX RENEWAL (OUTPATIENT)
Age: 67
End: 2022-06-08

## 2022-06-18 ENCOUNTER — APPOINTMENT (OUTPATIENT)
Dept: INTERNAL MEDICINE | Facility: CLINIC | Age: 67
End: 2022-06-18
Payer: MEDICARE

## 2022-06-18 VITALS
HEART RATE: 101 BPM | HEIGHT: 60 IN | SYSTOLIC BLOOD PRESSURE: 138 MMHG | WEIGHT: 140 LBS | DIASTOLIC BLOOD PRESSURE: 86 MMHG | BODY MASS INDEX: 27.48 KG/M2

## 2022-06-18 PROCEDURE — G0009: CPT

## 2022-06-18 PROCEDURE — G0439: CPT

## 2022-06-18 PROCEDURE — 90732 PPSV23 VACC 2 YRS+ SUBQ/IM: CPT

## 2022-06-18 NOTE — HEALTH RISK ASSESSMENT
[Good] : ~his/her~  mood as  good [Never] : Never [No] : No [No falls in past year] : Patient reported no falls in the past year [0] : 2) Feeling down, depressed, or hopeless: Not at all (0) [de-identified] : cr[place]  [de-identified] : regular  [Patient reported mammogram was normal] : Patient reported mammogram was normal [Patient reported colonoscopy was abnormal] : Patient reported colonoscopy was abnormal [Change in mental status noted] : No change in mental status noted [None] : None [Alone] : lives alone [Single] : single [Sexually Active] : not sexually active [Feels Safe at Home] : Feels safe at home [Fully functional (bathing, dressing, toileting, transferring, walking, feeding)] : Fully functional (bathing, dressing, toileting, transferring, walking, feeding) [Fully functional (using the telephone, shopping, preparing meals, housekeeping, doing laundry, using] : Fully functional and needs no help or supervision to perform IADLs (using the telephone, shopping, preparing meals, housekeeping, doing laundry, using transportation, managing medications and managing finances) [Reports changes in hearing] : Reports no changes in hearing [Reports changes in vision] : Reports no changes in vision [Reports changes in dental health] : Reports no changes in dental health [Smoke Detector] : smoke detector [Safety elements used in home] : safety elements used in home [Seat Belt] :  uses seat belt [MammogramDate] : 2021 [ColonoscopyDate] : 2021 [ColonoscopyComments] : polyps- rpt in 3 years [Name: ___] : Health Care Proxy's Name: [unfilled]  [Relationship: ___] : Relationship: [unfilled] [AdvancecareDate] : 6/18/22

## 2022-06-18 NOTE — PHYSICAL EXAM
[Normal Sclera/Conjunctiva] : normal sclera/conjunctiva [Normal Outer Ear/Nose] : the outer ears and nose were normal in appearance [No Edema] : there was no peripheral edema [Normal Appearance] : normal in appearance [No Masses] : no palpable masses [No Nipple Discharge] : no nipple discharge [No Axillary Lymphadenopathy] : no axillary lymphadenopathy [Normal] : affect was normal and insight and judgment were intact

## 2022-06-18 NOTE — ASSESSMENT
[FreeTextEntry1] : 1) WEllness check\par \par - diet / exercise discussed \par -home safety / fall prevention discussed\par - check labs\par - mammo - 8/22\par - needs BMD / pneumovax\par - UTD on CRC screening \par \par 2) DM / HTN/ HL\par - check A1C , lipid panel \par - ct meds\par - f/u in 3-4 month \par

## 2022-06-18 NOTE — REVIEW OF SYSTEMS
[Recent Change In Weight] : ~T recent weight change [Negative] : Heme/Lymph [FreeTextEntry2] : intentionally lost a few pounds

## 2022-06-21 LAB
ALBUMIN SERPL ELPH-MCNC: 4.9 G/DL
ALP BLD-CCNC: 71 U/L
ALT SERPL-CCNC: 10 U/L
ANION GAP SERPL CALC-SCNC: 13 MMOL/L
AST SERPL-CCNC: 18 U/L
BASOPHILS # BLD AUTO: 0.04 K/UL
BASOPHILS NFR BLD AUTO: 0.8 %
BILIRUB SERPL-MCNC: 0.4 MG/DL
BUN SERPL-MCNC: 9 MG/DL
CALCIUM SERPL-MCNC: 9.7 MG/DL
CHLORIDE SERPL-SCNC: 103 MMOL/L
CHOLEST SERPL-MCNC: 208 MG/DL
CO2 SERPL-SCNC: 25 MMOL/L
CREAT SERPL-MCNC: 0.76 MG/DL
EGFR: 86 ML/MIN/1.73M2
EOSINOPHIL # BLD AUTO: 0.04 K/UL
EOSINOPHIL NFR BLD AUTO: 0.8 %
ESTIMATED AVERAGE GLUCOSE: 123 MG/DL
GLUCOSE SERPL-MCNC: 90 MG/DL
HBA1C MFR BLD HPLC: 5.9 %
HCT VFR BLD CALC: 36.7 %
HDLC SERPL-MCNC: 71 MG/DL
HGB BLD-MCNC: 12.1 G/DL
IMM GRANULOCYTES NFR BLD AUTO: 0.2 %
LDLC SERPL CALC-MCNC: 113 MG/DL
LYMPHOCYTES # BLD AUTO: 2.11 K/UL
LYMPHOCYTES NFR BLD AUTO: 43.7 %
MAN DIFF?: NORMAL
MCHC RBC-ENTMCNC: 27.4 PG
MCHC RBC-ENTMCNC: 33 GM/DL
MCV RBC AUTO: 83 FL
MONOCYTES # BLD AUTO: 0.4 K/UL
MONOCYTES NFR BLD AUTO: 8.3 %
NEUTROPHILS # BLD AUTO: 2.23 K/UL
NEUTROPHILS NFR BLD AUTO: 46.2 %
NONHDLC SERPL-MCNC: 137 MG/DL
PLATELET # BLD AUTO: 286 K/UL
POTASSIUM SERPL-SCNC: 4.3 MMOL/L
PROT SERPL-MCNC: 7.5 G/DL
RBC # BLD: 4.42 M/UL
RBC # FLD: 13.4 %
SODIUM SERPL-SCNC: 141 MMOL/L
TRIGL SERPL-MCNC: 119 MG/DL
TSH SERPL-ACNC: 1.71 UIU/ML
WBC # FLD AUTO: 4.83 K/UL

## 2022-09-01 ENCOUNTER — RESULT REVIEW (OUTPATIENT)
Age: 67
End: 2022-09-01

## 2022-09-01 ENCOUNTER — OUTPATIENT (OUTPATIENT)
Dept: OUTPATIENT SERVICES | Facility: HOSPITAL | Age: 67
LOS: 1 days | End: 2022-09-01
Payer: MEDICARE

## 2022-09-01 ENCOUNTER — APPOINTMENT (OUTPATIENT)
Dept: MAMMOGRAPHY | Facility: IMAGING CENTER | Age: 67
End: 2022-09-01

## 2022-09-01 DIAGNOSIS — Z00.00 ENCOUNTER FOR GENERAL ADULT MEDICAL EXAMINATION WITHOUT ABNORMAL FINDINGS: ICD-10-CM

## 2022-09-01 PROCEDURE — 77063 BREAST TOMOSYNTHESIS BI: CPT | Mod: 26

## 2022-09-01 PROCEDURE — 77067 SCR MAMMO BI INCL CAD: CPT | Mod: 26

## 2022-09-01 PROCEDURE — 77067 SCR MAMMO BI INCL CAD: CPT

## 2022-09-01 PROCEDURE — 77063 BREAST TOMOSYNTHESIS BI: CPT

## 2022-09-06 ENCOUNTER — TRANSCRIPTION ENCOUNTER (OUTPATIENT)
Age: 67
End: 2022-09-06

## 2022-09-15 ENCOUNTER — TRANSCRIPTION ENCOUNTER (OUTPATIENT)
Age: 67
End: 2022-09-15

## 2022-10-11 ENCOUNTER — RX RENEWAL (OUTPATIENT)
Age: 67
End: 2022-10-11

## 2022-10-12 ENCOUNTER — TRANSCRIPTION ENCOUNTER (OUTPATIENT)
Age: 67
End: 2022-10-12

## 2022-10-15 ENCOUNTER — APPOINTMENT (OUTPATIENT)
Dept: INTERNAL MEDICINE | Facility: CLINIC | Age: 67
End: 2022-10-15

## 2022-10-20 ENCOUNTER — APPOINTMENT (OUTPATIENT)
Dept: INTERNAL MEDICINE | Facility: CLINIC | Age: 67
End: 2022-10-20

## 2022-10-20 PROCEDURE — 90662 IIV NO PRSV INCREASED AG IM: CPT

## 2022-10-20 PROCEDURE — G0008: CPT

## 2022-12-05 ENCOUNTER — RX RENEWAL (OUTPATIENT)
Age: 67
End: 2022-12-05

## 2023-01-23 ENCOUNTER — APPOINTMENT (OUTPATIENT)
Dept: INTERNAL MEDICINE | Facility: CLINIC | Age: 68
End: 2023-01-23
Payer: MEDICARE

## 2023-01-23 ENCOUNTER — NON-APPOINTMENT (OUTPATIENT)
Age: 68
End: 2023-01-23

## 2023-01-23 VITALS
SYSTOLIC BLOOD PRESSURE: 161 MMHG | TEMPERATURE: 97.9 F | WEIGHT: 141 LBS | HEART RATE: 99 BPM | HEIGHT: 60 IN | DIASTOLIC BLOOD PRESSURE: 98 MMHG | OXYGEN SATURATION: 97 % | BODY MASS INDEX: 27.68 KG/M2

## 2023-01-23 VITALS — DIASTOLIC BLOOD PRESSURE: 78 MMHG | SYSTOLIC BLOOD PRESSURE: 142 MMHG

## 2023-01-23 PROCEDURE — 93000 ELECTROCARDIOGRAM COMPLETE: CPT | Mod: 59

## 2023-01-23 PROCEDURE — 36415 COLL VENOUS BLD VENIPUNCTURE: CPT

## 2023-01-23 PROCEDURE — G0444 DEPRESSION SCREEN ANNUAL: CPT | Mod: 59

## 2023-01-23 PROCEDURE — 99204 OFFICE O/P NEW MOD 45 MIN: CPT | Mod: 25

## 2023-01-23 NOTE — HEALTH RISK ASSESSMENT
[Never] : Never [No] : In the past 12 months have you used drugs other than those required for medical reasons? No [No falls in past year] : Patient reported no falls in the past year [0] : 2) Feeling down, depressed, or hopeless: Not at all (0) [PHQ-2 Negative - No further assessment needed] : PHQ-2 Negative - No further assessment needed [Patient reported mammogram was normal] : Patient reported mammogram was normal [Alone] : lives alone [Retired] : retired [Single] : single [Feels Safe at Home] : Feels safe at home [Fully functional (bathing, dressing, toileting, transferring, walking, feeding)] : Fully functional (bathing, dressing, toileting, transferring, walking, feeding) [Fully functional (using the telephone, shopping, preparing meals, housekeeping, doing laundry, using] : Fully functional and needs no help or supervision to perform IADLs (using the telephone, shopping, preparing meals, housekeeping, doing laundry, using transportation, managing medications and managing finances) [[id number]] : 0 [Sexually Active] : not sexually active [MammogramDate] : 9/2022 [MammogramComments] : birads 2 [PapSmearDate] : last in her 30s [ColonoscopyDate] : 12/2021 [ColonoscopyComments] : daniel, due in 12/2024

## 2023-01-23 NOTE — HISTORY OF PRESENT ILLNESS
[FreeTextEntry1] : est care [de-identified] : BREE VALIENTE is a 67 year F who presents to est care\par PMHx DM, HTN, HLD, LBP \par Previously saw Dr. Alba, last AWV in 6/2022\par Feels well overall\par BPs at home 110s-120s/60s-80s\par BS at home 90s-110s\par Flu vaccine 10/2022\par Covid vaccine- s/p 2 doses \par

## 2023-01-23 NOTE — ASSESSMENT
[FreeTextEntry1] : HCM\par -well visit\par -routine labs- drawn in house\par -ekg- SR\par -mammogram- due 9/2023\par -pap smear- has not had since her 30s; given referral for gyn\par -colonoscopy- due 12/2024\par -dexa- script given \par -flu shot- utd\par -pneumonia vaccine- utd\par -return in 3 months\par \par DM\par -A1C 6/2022 5.9; highest A1C in chart since 2017 is 6.0\par -BS at home reviewed\par -f/u a1c drawn today, if within the same range, will decrease metformin to once daily \par -saw ophtho this past year, will see podiatry \par \par HTN\par -BP elevated but home logs show well controlled bp\par -continue ramipril\par -she will bring logs and BP machine on next visit\par \par HLD\par -zetia\par \par \par

## 2023-01-23 NOTE — PHYSICAL EXAM
[No Acute Distress] : no acute distress [Well-Appearing] : well-appearing [Normal Sclera/Conjunctiva] : normal sclera/conjunctiva [EOMI] : extraocular movements intact [Normal Outer Ear/Nose] : the outer ears and nose were normal in appearance [Normal Oropharynx] : the oropharynx was normal [Normal TMs] : both tympanic membranes were normal [No Lymphadenopathy] : no lymphadenopathy [Supple] : supple [Thyroid Normal, No Nodules] : the thyroid was normal and there were no nodules present [No Respiratory Distress] : no respiratory distress  [No Accessory Muscle Use] : no accessory muscle use [Clear to Auscultation] : lungs were clear to auscultation bilaterally [Normal Rate] : normal rate  [Normal S1, S2] : normal S1 and S2 [No Edema] : there was no peripheral edema [No Extremity Clubbing/Cyanosis] : no extremity clubbing/cyanosis [Soft] : abdomen soft [Non Tender] : non-tender [Non-distended] : non-distended [Coordination Grossly Intact] : coordination grossly intact [No Focal Deficits] : no focal deficits [Normal Gait] : normal gait [Normal Affect] : the affect was normal [Normal Insight/Judgement] : insight and judgment were intact

## 2023-01-24 LAB
25(OH)D3 SERPL-MCNC: 63.5 NG/ML
ALBUMIN SERPL ELPH-MCNC: 4.6 G/DL
ALP BLD-CCNC: 64 U/L
ALT SERPL-CCNC: 10 U/L
ANION GAP SERPL CALC-SCNC: 13 MMOL/L
AST SERPL-CCNC: 17 U/L
BASOPHILS # BLD AUTO: 0.04 K/UL
BASOPHILS NFR BLD AUTO: 0.9 %
BILIRUB SERPL-MCNC: 0.2 MG/DL
BUN SERPL-MCNC: 8 MG/DL
CALCIUM SERPL-MCNC: 9.9 MG/DL
CHLORIDE SERPL-SCNC: 104 MMOL/L
CHOLEST SERPL-MCNC: 201 MG/DL
CO2 SERPL-SCNC: 24 MMOL/L
CREAT SERPL-MCNC: 0.73 MG/DL
EGFR: 91 ML/MIN/1.73M2
EOSINOPHIL # BLD AUTO: 0.07 K/UL
EOSINOPHIL NFR BLD AUTO: 1.6 %
ESTIMATED AVERAGE GLUCOSE: 123 MG/DL
GLUCOSE SERPL-MCNC: 88 MG/DL
HBA1C MFR BLD HPLC: 5.9 %
HCT VFR BLD CALC: 36.7 %
HDLC SERPL-MCNC: 66 MG/DL
HGB BLD-MCNC: 11.8 G/DL
IMM GRANULOCYTES NFR BLD AUTO: 0.2 %
LDLC SERPL CALC-MCNC: 122 MG/DL
LYMPHOCYTES # BLD AUTO: 2.05 K/UL
LYMPHOCYTES NFR BLD AUTO: 45.9 %
MAN DIFF?: NORMAL
MCHC RBC-ENTMCNC: 27.6 PG
MCHC RBC-ENTMCNC: 32.2 GM/DL
MCV RBC AUTO: 85.9 FL
MONOCYTES # BLD AUTO: 0.41 K/UL
MONOCYTES NFR BLD AUTO: 9.2 %
NEUTROPHILS # BLD AUTO: 1.89 K/UL
NEUTROPHILS NFR BLD AUTO: 42.2 %
NONHDLC SERPL-MCNC: 135 MG/DL
PLATELET # BLD AUTO: 328 K/UL
POTASSIUM SERPL-SCNC: 4.6 MMOL/L
PROT SERPL-MCNC: 7.2 G/DL
RBC # BLD: 4.27 M/UL
RBC # FLD: 13.8 %
SODIUM SERPL-SCNC: 141 MMOL/L
TRIGL SERPL-MCNC: 67 MG/DL
WBC # FLD AUTO: 4.47 K/UL

## 2023-04-24 ENCOUNTER — APPOINTMENT (OUTPATIENT)
Dept: INTERNAL MEDICINE | Facility: CLINIC | Age: 68
End: 2023-04-24
Payer: MEDICARE

## 2023-04-24 VITALS
SYSTOLIC BLOOD PRESSURE: 159 MMHG | HEART RATE: 98 BPM | HEIGHT: 60 IN | WEIGHT: 144 LBS | DIASTOLIC BLOOD PRESSURE: 83 MMHG | BODY MASS INDEX: 28.27 KG/M2 | TEMPERATURE: 99 F | OXYGEN SATURATION: 95 % | RESPIRATION RATE: 15 BRPM

## 2023-04-24 VITALS — DIASTOLIC BLOOD PRESSURE: 80 MMHG | SYSTOLIC BLOOD PRESSURE: 138 MMHG

## 2023-04-24 PROCEDURE — 99214 OFFICE O/P EST MOD 30 MIN: CPT | Mod: 25

## 2023-04-24 PROCEDURE — 36415 COLL VENOUS BLD VENIPUNCTURE: CPT

## 2023-04-24 NOTE — ASSESSMENT
[FreeTextEntry1] : DM\par -BS at home reviewed, within range\par -on metformin 500mg qd\par -f/u a1c and ACR\par \par HTN\par -BP well controlled at home (logs reviewed); borderline here\par -continue ramipril\par -discussed parameters of normal bp\par -to continue to check bps at home and call/return if high or low\par \par HLD\par -zetia 10mg qhs\par -follow up lipid panel\par \par HCM\par -mammogram- due 9/2023, script given\par -pap smear- has not had since her 30s; she will make appt\par -colonoscopy- due 12/2024\par -dexa- script renewed\par -advised to see derm, ophtho and dentist annually

## 2023-04-24 NOTE — PHYSICAL EXAM
[No Acute Distress] : no acute distress [Well-Appearing] : well-appearing [Normal Sclera/Conjunctiva] : normal sclera/conjunctiva [EOMI] : extraocular movements intact [No Respiratory Distress] : no respiratory distress  [No Accessory Muscle Use] : no accessory muscle use [Clear to Auscultation] : lungs were clear to auscultation bilaterally [Normal Rate] : normal rate  [Regular Rhythm] : with a regular rhythm [Normal S1, S2] : normal S1 and S2 [No Edema] : there was no peripheral edema [No Extremity Clubbing/Cyanosis] : no extremity clubbing/cyanosis [Coordination Grossly Intact] : coordination grossly intact [No Focal Deficits] : no focal deficits [Normal Affect] : the affect was normal [Normal Insight/Judgement] : insight and judgment were intact

## 2023-04-24 NOTE — HISTORY OF PRESENT ILLNESS
[FreeTextEntry1] : follow up [de-identified] : BREE VALIENTE is a 68 year F who presents for follow up\par Feels well overall \par BPs at home 110s-120s/60-70s at home, max SBP is 130s\par BS at home 70s-100s\par \par

## 2023-04-25 LAB
CHOLEST SERPL-MCNC: 204 MG/DL
CREAT SPEC-SCNC: 73 MG/DL
ESTIMATED AVERAGE GLUCOSE: 126 MG/DL
HBA1C MFR BLD HPLC: 6 %
HDLC SERPL-MCNC: 68 MG/DL
LDLC SERPL CALC-MCNC: 119 MG/DL
MICROALBUMIN 24H UR DL<=1MG/L-MCNC: <1.2 MG/DL
MICROALBUMIN/CREAT 24H UR-RTO: NORMAL MG/G
NONHDLC SERPL-MCNC: 136 MG/DL
TRIGL SERPL-MCNC: 87 MG/DL

## 2023-05-01 ENCOUNTER — RX CHANGE (OUTPATIENT)
Age: 68
End: 2023-05-01

## 2023-05-02 ENCOUNTER — RX CHANGE (OUTPATIENT)
Age: 68
End: 2023-05-02

## 2023-05-03 ENCOUNTER — RX CHANGE (OUTPATIENT)
Age: 68
End: 2023-05-03

## 2023-05-12 ENCOUNTER — RX CHANGE (OUTPATIENT)
Age: 68
End: 2023-05-12

## 2023-05-17 RX ORDER — EZETIMIBE 10 MG/1
10 TABLET ORAL
Qty: 90 | Refills: 1 | Status: DISCONTINUED | COMMUNITY
Start: 2021-01-27 | End: 2023-05-17

## 2023-06-27 ENCOUNTER — OUTPATIENT (OUTPATIENT)
Dept: OUTPATIENT SERVICES | Facility: HOSPITAL | Age: 68
LOS: 1 days | End: 2023-06-27
Payer: MEDICARE

## 2023-06-27 ENCOUNTER — APPOINTMENT (OUTPATIENT)
Dept: RADIOLOGY | Facility: IMAGING CENTER | Age: 68
End: 2023-06-27
Payer: MEDICARE

## 2023-06-27 DIAGNOSIS — Z00.00 ENCOUNTER FOR GENERAL ADULT MEDICAL EXAMINATION WITHOUT ABNORMAL FINDINGS: ICD-10-CM

## 2023-06-27 PROCEDURE — 77085 DXA BONE DENSITY AXL VRT FX: CPT

## 2023-06-27 PROCEDURE — 77085 DXA BONE DENSITY AXL VRT FX: CPT | Mod: 26

## 2023-08-20 ENCOUNTER — RX RENEWAL (OUTPATIENT)
Age: 68
End: 2023-08-20

## 2023-09-05 ENCOUNTER — APPOINTMENT (OUTPATIENT)
Dept: MAMMOGRAPHY | Facility: IMAGING CENTER | Age: 68
End: 2023-09-05
Payer: MEDICARE

## 2023-09-05 ENCOUNTER — OUTPATIENT (OUTPATIENT)
Dept: OUTPATIENT SERVICES | Facility: HOSPITAL | Age: 68
LOS: 1 days | End: 2023-09-05
Payer: MEDICARE

## 2023-09-05 ENCOUNTER — RESULT REVIEW (OUTPATIENT)
Age: 68
End: 2023-09-05

## 2023-09-05 DIAGNOSIS — Z00.8 ENCOUNTER FOR OTHER GENERAL EXAMINATION: ICD-10-CM

## 2023-09-05 PROCEDURE — 77063 BREAST TOMOSYNTHESIS BI: CPT

## 2023-09-05 PROCEDURE — 77067 SCR MAMMO BI INCL CAD: CPT | Mod: 26

## 2023-09-05 PROCEDURE — 77063 BREAST TOMOSYNTHESIS BI: CPT | Mod: 26

## 2023-09-05 PROCEDURE — 77067 SCR MAMMO BI INCL CAD: CPT

## 2023-09-27 ENCOUNTER — APPOINTMENT (OUTPATIENT)
Dept: INTERNAL MEDICINE | Facility: CLINIC | Age: 68
End: 2023-09-27
Payer: MEDICARE

## 2023-09-27 VITALS
OXYGEN SATURATION: 98 % | BODY MASS INDEX: 27.88 KG/M2 | WEIGHT: 142 LBS | DIASTOLIC BLOOD PRESSURE: 94 MMHG | HEART RATE: 98 BPM | SYSTOLIC BLOOD PRESSURE: 166 MMHG | HEIGHT: 60 IN | TEMPERATURE: 98.6 F

## 2023-09-27 VITALS — SYSTOLIC BLOOD PRESSURE: 132 MMHG | DIASTOLIC BLOOD PRESSURE: 80 MMHG

## 2023-09-27 DIAGNOSIS — M54.50 LOW BACK PAIN, UNSPECIFIED: ICD-10-CM

## 2023-09-27 PROCEDURE — 90662 IIV NO PRSV INCREASED AG IM: CPT

## 2023-09-27 PROCEDURE — G0008: CPT

## 2023-09-27 PROCEDURE — 99214 OFFICE O/P EST MOD 30 MIN: CPT | Mod: 25

## 2023-09-27 PROCEDURE — 36415 COLL VENOUS BLD VENIPUNCTURE: CPT

## 2023-09-27 RX ORDER — ROSUVASTATIN CALCIUM 5 MG/1
5 TABLET, FILM COATED ORAL
Qty: 90 | Refills: 0 | Status: DISCONTINUED | COMMUNITY
Start: 2023-04-25 | End: 2023-09-27

## 2023-09-29 LAB
CHOLEST SERPL-MCNC: 216 MG/DL
ESTIMATED AVERAGE GLUCOSE: 128 MG/DL
HBA1C MFR BLD HPLC: 6.1 %
HDLC SERPL-MCNC: 63 MG/DL
LDLC SERPL CALC-MCNC: 131 MG/DL
NONHDLC SERPL-MCNC: 153 MG/DL
TRIGL SERPL-MCNC: 125 MG/DL

## 2023-10-02 ENCOUNTER — APPOINTMENT (OUTPATIENT)
Dept: DERMATOLOGY | Facility: CLINIC | Age: 68
End: 2023-10-02

## 2023-10-24 ENCOUNTER — RX RENEWAL (OUTPATIENT)
Age: 68
End: 2023-10-24

## 2023-11-06 ENCOUNTER — RX RENEWAL (OUTPATIENT)
Age: 68
End: 2023-11-06

## 2023-11-07 ENCOUNTER — APPOINTMENT (OUTPATIENT)
Dept: DERMATOLOGY | Facility: CLINIC | Age: 68
End: 2023-11-07
Payer: MEDICARE

## 2023-11-07 VITALS — WEIGHT: 139 LBS | HEIGHT: 61 IN | BODY MASS INDEX: 26.24 KG/M2

## 2023-11-07 DIAGNOSIS — L91.8 OTHER HYPERTROPHIC DISORDERS OF THE SKIN: ICD-10-CM

## 2023-11-07 DIAGNOSIS — L72.0 EPIDERMAL CYST: ICD-10-CM

## 2023-11-07 DIAGNOSIS — D23.9 OTHER BENIGN NEOPLASM OF SKIN, UNSPECIFIED: ICD-10-CM

## 2023-11-07 DIAGNOSIS — L82.1 OTHER SEBORRHEIC KERATOSIS: ICD-10-CM

## 2023-11-07 PROCEDURE — 99203 OFFICE O/P NEW LOW 30 MIN: CPT

## 2023-11-10 ENCOUNTER — RX RENEWAL (OUTPATIENT)
Age: 68
End: 2023-11-10

## 2023-12-13 ENCOUNTER — APPOINTMENT (OUTPATIENT)
Dept: INTERNAL MEDICINE | Facility: CLINIC | Age: 68
End: 2023-12-13
Payer: MEDICARE

## 2023-12-13 VITALS — SYSTOLIC BLOOD PRESSURE: 130 MMHG | DIASTOLIC BLOOD PRESSURE: 78 MMHG

## 2023-12-13 VITALS
BODY MASS INDEX: 26.81 KG/M2 | WEIGHT: 142 LBS | TEMPERATURE: 97.8 F | OXYGEN SATURATION: 98 % | HEIGHT: 61 IN | HEART RATE: 93 BPM | SYSTOLIC BLOOD PRESSURE: 171 MMHG | DIASTOLIC BLOOD PRESSURE: 91 MMHG

## 2023-12-13 DIAGNOSIS — M54.2 CERVICALGIA: ICD-10-CM

## 2023-12-13 PROCEDURE — 36415 COLL VENOUS BLD VENIPUNCTURE: CPT

## 2023-12-13 PROCEDURE — 99214 OFFICE O/P EST MOD 30 MIN: CPT | Mod: 25

## 2023-12-13 RX ORDER — BLOOD SUGAR DIAGNOSTIC
STRIP MISCELLANEOUS DAILY
Qty: 100 | Refills: 1 | Status: COMPLETED | COMMUNITY
Start: 2020-07-14 | End: 2023-12-13

## 2023-12-13 RX ORDER — BLOOD SUGAR DIAGNOSTIC
STRIP MISCELLANEOUS
Qty: 100 | Refills: 0 | Status: COMPLETED | COMMUNITY
Start: 2022-06-08 | End: 2023-12-13

## 2023-12-13 RX ORDER — EZETIMIBE 10 MG/1
10 TABLET ORAL
Qty: 90 | Refills: 0 | Status: COMPLETED | COMMUNITY
Start: 2023-05-17 | End: 2023-12-13

## 2023-12-13 NOTE — PHYSICAL EXAM
[No Acute Distress] : no acute distress [Normal Sclera/Conjunctiva] : normal sclera/conjunctiva [EOMI] : extraocular movements intact [No Respiratory Distress] : no respiratory distress  [No Accessory Muscle Use] : no accessory muscle use [Clear to Auscultation] : lungs were clear to auscultation bilaterally [Normal Rate] : normal rate  [Regular Rhythm] : with a regular rhythm [Normal S1, S2] : normal S1 and S2 [No Edema] : there was no peripheral edema [No Extremity Clubbing/Cyanosis] : no extremity clubbing/cyanosis [Soft] : abdomen soft [Non Tender] : non-tender [Non-distended] : non-distended [Normal Bowel Sounds] : normal bowel sounds [Normal Affect] : the affect was normal [Normal Insight/Judgement] : insight and judgment were intact [de-identified] : no cervical spine tendernesss, cerivcal ROM markedly limited in b/l rotatition 2/2 pain

## 2023-12-13 NOTE — ASSESSMENT
[FreeTextEntry1] : DM2 -c/w meformin -checks AM BS- at goal, discussed can continue if desired but not necessary -ACR wnl 4/2023 -a1c ordered -advised to see ophthalmology and podiatry annually  HLD -c/w statin and zetia -lipid panel and cmp ordered   HTN -bp elevated initially but improved; always elevated initially in office -home logs reviewed and well controlled  -c/w Ramipril   Cervical pain -likely muscular, no spinal tenderness -discussed conservative measures including stretching, good posture, tylenol/nsaid prn -PT ordered -avoid use of collar -if refractory, will send to nic

## 2023-12-13 NOTE — HISTORY OF PRESENT ILLNESS
[FreeTextEntry1] : follow up [de-identified] : BREE VALIENTE is a 68 year F who presents for follow up PMHx HTN, HLD, DM2 Feels well overall Reports chronic neck pain which has been worse in recent weeks She has been wearing a neck collar for support. No injury or trauma.

## 2023-12-15 LAB
ALBUMIN SERPL ELPH-MCNC: 4.8 G/DL
ALP BLD-CCNC: 73 U/L
ALT SERPL-CCNC: 15 U/L
ANION GAP SERPL CALC-SCNC: 14 MMOL/L
AST SERPL-CCNC: 19 U/L
BILIRUB SERPL-MCNC: 0.3 MG/DL
BUN SERPL-MCNC: 9 MG/DL
CALCIUM SERPL-MCNC: 10.1 MG/DL
CHLORIDE SERPL-SCNC: 102 MMOL/L
CHOLEST SERPL-MCNC: 157 MG/DL
CO2 SERPL-SCNC: 25 MMOL/L
CREAT SERPL-MCNC: 0.65 MG/DL
EGFR: 96 ML/MIN/1.73M2
ESTIMATED AVERAGE GLUCOSE: 128 MG/DL
GLUCOSE SERPL-MCNC: 87 MG/DL
HBA1C MFR BLD HPLC: 6.1 %
HDLC SERPL-MCNC: 70 MG/DL
LDLC SERPL CALC-MCNC: 72 MG/DL
NONHDLC SERPL-MCNC: 86 MG/DL
POTASSIUM SERPL-SCNC: 4.2 MMOL/L
PROT SERPL-MCNC: 7.1 G/DL
SODIUM SERPL-SCNC: 141 MMOL/L
TRIGL SERPL-MCNC: 73 MG/DL

## 2023-12-27 ENCOUNTER — TRANSCRIPTION ENCOUNTER (OUTPATIENT)
Age: 68
End: 2023-12-27

## 2024-01-31 ENCOUNTER — TRANSCRIPTION ENCOUNTER (OUTPATIENT)
Age: 69
End: 2024-01-31

## 2024-01-31 RX ORDER — BLOOD SUGAR DIAGNOSTIC
STRIP MISCELLANEOUS
Qty: 100 | Refills: 1 | Status: ACTIVE | COMMUNITY
Start: 2022-03-11 | End: 1900-01-01

## 2024-02-04 ENCOUNTER — RX RENEWAL (OUTPATIENT)
Age: 69
End: 2024-02-04

## 2024-03-13 ENCOUNTER — APPOINTMENT (OUTPATIENT)
Dept: INTERNAL MEDICINE | Facility: CLINIC | Age: 69
End: 2024-03-13
Payer: MEDICARE

## 2024-03-13 ENCOUNTER — NON-APPOINTMENT (OUTPATIENT)
Age: 69
End: 2024-03-13

## 2024-03-13 VITALS
WEIGHT: 143 LBS | OXYGEN SATURATION: 94 % | HEIGHT: 61 IN | HEART RATE: 80 BPM | TEMPERATURE: 98.2 F | BODY MASS INDEX: 27 KG/M2 | SYSTOLIC BLOOD PRESSURE: 152 MMHG | RESPIRATION RATE: 15 BRPM | DIASTOLIC BLOOD PRESSURE: 88 MMHG

## 2024-03-13 VITALS — SYSTOLIC BLOOD PRESSURE: 126 MMHG | DIASTOLIC BLOOD PRESSURE: 74 MMHG

## 2024-03-13 DIAGNOSIS — E11.9 TYPE 2 DIABETES MELLITUS W/OUT COMPLICATIONS: ICD-10-CM

## 2024-03-13 DIAGNOSIS — E78.5 HYPERLIPIDEMIA, UNSPECIFIED: ICD-10-CM

## 2024-03-13 DIAGNOSIS — Z00.00 ENCOUNTER FOR GENERAL ADULT MEDICAL EXAMINATION W/OUT ABNORMAL FINDINGS: ICD-10-CM

## 2024-03-13 DIAGNOSIS — I10 ESSENTIAL (PRIMARY) HYPERTENSION: ICD-10-CM

## 2024-03-13 PROCEDURE — G0439: CPT

## 2024-03-13 PROCEDURE — 93000 ELECTROCARDIOGRAM COMPLETE: CPT

## 2024-03-13 PROCEDURE — 36415 COLL VENOUS BLD VENIPUNCTURE: CPT

## 2024-03-13 NOTE — HEALTH RISK ASSESSMENT
[No] : In the past 12 months have you used drugs other than those required for medical reasons? No [No falls in past year] : Patient reported no falls in the past year [0] : 2) Feeling down, depressed, or hopeless: Not at all (0) [PHQ-2 Negative - No further assessment needed] : PHQ-2 Negative - No further assessment needed [Alone] : lives alone [Retired] : retired [Single] : single [Feels Safe at Home] : Feels safe at home [Fully functional (using the telephone, shopping, preparing meals, housekeeping, doing laundry, using] : Fully functional and needs no help or supervision to perform IADLs (using the telephone, shopping, preparing meals, housekeeping, doing laundry, using transportation, managing medications and managing finances) [Fully functional (bathing, dressing, toileting, transferring, walking, feeding)] : Fully functional (bathing, dressing, toileting, transferring, walking, feeding) [Never] : Never [[id number]] : 0 [MammogramDate] : 9/2023 [MammogramComments] : birads 2 [BoneDensityDate] : 6/2023 [PapSmearDate] : last pap in her 30s  [BoneDensityComments] : normal  [ColonoscopyDate] : 12/2021 [ColonoscopyComments] : GI daniel Rowan, due in 12/2024

## 2024-03-13 NOTE — ASSESSMENT
[FreeTextEntry1] : Health Care Maintenance - well visit - routine labs ordered - depression screen negative - ekg SR - pap smear- due, last in her 30s, gyn referral given again - mammogram 9/2023. birads 2.   - colonoscopy due 12/2024 with GI Dr. Petersen - dexa  6/2023. normal.  - flu vaccine 9/2023 - covid vaccines s/p 3 doses  - tdap -unsure when last received, advised to check records and obtain if 10+ years since last dose  - shingles vaccine- recommend  - pneumonia vaccine- prevnar-13 2016 and pneumovax 2022 - advised to get annual eye exams with optometry/ophthalmology, skin exams with dermatology, and dental exams - RTC for CPE in 1 year or sooner prn  DM2 -c/w metformin -check a1c -advised to see ophthalmology and podiatry annually  HTN -bp initially elevated but repeat wnl -home BP logs reviewed and good, home BP machine corresponds to office read -c/w ramipril  HLD -c/w zetia and statin -check lipid panel and cmp

## 2024-03-13 NOTE — PHYSICAL EXAM
[Well-Appearing] : well-appearing [No Acute Distress] : no acute distress [Normal Sclera/Conjunctiva] : normal sclera/conjunctiva [EOMI] : extraocular movements intact [No Lymphadenopathy] : no lymphadenopathy [Supple] : supple [Thyroid Normal, No Nodules] : the thyroid was normal and there were no nodules present [No Respiratory Distress] : no respiratory distress  [No Accessory Muscle Use] : no accessory muscle use [Clear to Auscultation] : lungs were clear to auscultation bilaterally [Normal Rate] : normal rate  [Regular Rhythm] : with a regular rhythm [Normal S1, S2] : normal S1 and S2 [No Edema] : there was no peripheral edema [No Extremity Clubbing/Cyanosis] : no extremity clubbing/cyanosis [Non Tender] : non-tender [Soft] : abdomen soft [Non-distended] : non-distended [Normal Bowel Sounds] : normal bowel sounds [Normal Affect] : the affect was normal [Normal Insight/Judgement] : insight and judgment were intact

## 2024-03-14 DIAGNOSIS — D64.9 ANEMIA, UNSPECIFIED: ICD-10-CM

## 2024-03-17 LAB
25(OH)D3 SERPL-MCNC: 59.2 NG/ML
ALBUMIN SERPL ELPH-MCNC: 4.7 G/DL
ALP BLD-CCNC: 75 U/L
ALT SERPL-CCNC: 14 U/L
ANION GAP SERPL CALC-SCNC: 12 MMOL/L
AST SERPL-CCNC: 19 U/L
BILIRUB SERPL-MCNC: 0.4 MG/DL
BUN SERPL-MCNC: 10 MG/DL
CALCIUM SERPL-MCNC: 9.6 MG/DL
CHLORIDE SERPL-SCNC: 102 MMOL/L
CHOLEST SERPL-MCNC: 157 MG/DL
CO2 SERPL-SCNC: 27 MMOL/L
CREAT SERPL-MCNC: 0.79 MG/DL
EGFR: 82 ML/MIN/1.73M2
ESTIMATED AVERAGE GLUCOSE: 131 MG/DL
FERRITIN SERPL-MCNC: 65 NG/ML
FOLATE SERPL-MCNC: 18.9 NG/ML
GLUCOSE SERPL-MCNC: 83 MG/DL
HBA1C MFR BLD HPLC: 6.2 %
HCT VFR BLD CALC: 36.8 %
HDLC SERPL-MCNC: 64 MG/DL
HGB BLD-MCNC: 11.4 G/DL
IRON SATN MFR SERPL: 27 %
IRON SERPL-MCNC: 99 UG/DL
LDLC SERPL CALC-MCNC: 80 MG/DL
MCHC RBC-ENTMCNC: 26.9 PG
MCHC RBC-ENTMCNC: 31 GM/DL
MCV RBC AUTO: 86.8 FL
NONHDLC SERPL-MCNC: 93 MG/DL
PLATELET # BLD AUTO: 291 K/UL
POTASSIUM SERPL-SCNC: 4.6 MMOL/L
PROT SERPL-MCNC: 7.3 G/DL
RBC # BLD: 4.24 M/UL
RBC # FLD: 14 %
SODIUM SERPL-SCNC: 140 MMOL/L
TIBC SERPL-MCNC: 372 UG/DL
TRANSFERRIN SERPL-MCNC: 289 MG/DL
TRIGL SERPL-MCNC: 68 MG/DL
TSH SERPL-ACNC: 1.85 UIU/ML
UIBC SERPL-MCNC: 273 UG/DL
VIT B12 SERPL-MCNC: 160 PG/ML
WBC # FLD AUTO: 4.17 K/UL

## 2024-03-25 ENCOUNTER — TRANSCRIPTION ENCOUNTER (OUTPATIENT)
Age: 69
End: 2024-03-25

## 2024-03-25 ENCOUNTER — RX RENEWAL (OUTPATIENT)
Age: 69
End: 2024-03-25

## 2024-03-25 RX ORDER — ATORVASTATIN CALCIUM 10 MG/1
10 TABLET, FILM COATED ORAL
Qty: 90 | Refills: 1 | Status: ACTIVE | COMMUNITY
Start: 2023-09-29 | End: 1900-01-01

## 2024-05-03 ENCOUNTER — RX RENEWAL (OUTPATIENT)
Age: 69
End: 2024-05-03

## 2024-05-14 RX ORDER — EZETIMIBE 10 MG/1
10 TABLET ORAL
Qty: 90 | Refills: 1 | Status: ACTIVE | COMMUNITY
Start: 2019-02-21 | End: 1900-01-01

## 2024-07-23 ENCOUNTER — RX RENEWAL (OUTPATIENT)
Age: 69
End: 2024-07-23

## 2024-07-25 ENCOUNTER — RX RENEWAL (OUTPATIENT)
Age: 69
End: 2024-07-25

## 2024-07-31 ENCOUNTER — RX RENEWAL (OUTPATIENT)
Age: 69
End: 2024-07-31

## 2024-08-14 ENCOUNTER — RX RENEWAL (OUTPATIENT)
Age: 69
End: 2024-08-14

## 2024-09-11 ENCOUNTER — APPOINTMENT (OUTPATIENT)
Dept: MAMMOGRAPHY | Facility: IMAGING CENTER | Age: 69
End: 2024-09-11
Payer: MEDICARE

## 2024-09-11 ENCOUNTER — OUTPATIENT (OUTPATIENT)
Dept: OUTPATIENT SERVICES | Facility: HOSPITAL | Age: 69
LOS: 1 days | End: 2024-09-11
Payer: MEDICARE

## 2024-09-11 ENCOUNTER — RESULT REVIEW (OUTPATIENT)
Age: 69
End: 2024-09-11

## 2024-09-11 DIAGNOSIS — Z00.8 ENCOUNTER FOR OTHER GENERAL EXAMINATION: ICD-10-CM

## 2024-09-11 PROCEDURE — 77063 BREAST TOMOSYNTHESIS BI: CPT

## 2024-09-11 PROCEDURE — 77063 BREAST TOMOSYNTHESIS BI: CPT | Mod: 26

## 2024-09-11 PROCEDURE — 77067 SCR MAMMO BI INCL CAD: CPT | Mod: 26

## 2024-09-11 PROCEDURE — 77067 SCR MAMMO BI INCL CAD: CPT

## 2024-09-16 ENCOUNTER — RX RENEWAL (OUTPATIENT)
Age: 69
End: 2024-09-16

## 2024-09-18 ENCOUNTER — APPOINTMENT (OUTPATIENT)
Dept: INTERNAL MEDICINE | Facility: CLINIC | Age: 69
End: 2024-09-18
Payer: MEDICARE

## 2024-09-18 VITALS
RESPIRATION RATE: 15 BRPM | DIASTOLIC BLOOD PRESSURE: 82 MMHG | HEIGHT: 61 IN | TEMPERATURE: 96.9 F | HEART RATE: 91 BPM | SYSTOLIC BLOOD PRESSURE: 145 MMHG | OXYGEN SATURATION: 98 % | WEIGHT: 143 LBS | BODY MASS INDEX: 27 KG/M2

## 2024-09-18 VITALS — DIASTOLIC BLOOD PRESSURE: 74 MMHG | SYSTOLIC BLOOD PRESSURE: 136 MMHG

## 2024-09-18 DIAGNOSIS — I10 ESSENTIAL (PRIMARY) HYPERTENSION: ICD-10-CM

## 2024-09-18 DIAGNOSIS — E11.9 TYPE 2 DIABETES MELLITUS W/OUT COMPLICATIONS: ICD-10-CM

## 2024-09-18 DIAGNOSIS — E78.5 HYPERLIPIDEMIA, UNSPECIFIED: ICD-10-CM

## 2024-09-18 DIAGNOSIS — E53.8 DEFICIENCY OF OTHER SPECIFIED B GROUP VITAMINS: ICD-10-CM

## 2024-09-18 DIAGNOSIS — M54.2 CERVICALGIA: ICD-10-CM

## 2024-09-18 PROCEDURE — G2211 COMPLEX E/M VISIT ADD ON: CPT

## 2024-09-18 PROCEDURE — 99214 OFFICE O/P EST MOD 30 MIN: CPT

## 2024-09-18 PROCEDURE — G0008: CPT

## 2024-09-18 PROCEDURE — 90662 IIV NO PRSV INCREASED AG IM: CPT

## 2024-09-18 PROCEDURE — 36415 COLL VENOUS BLD VENIPUNCTURE: CPT

## 2024-09-18 RX ORDER — CYANOCOBALAMIN (VITAMIN B-12) 1000 MCG
1000 TABLET ORAL DAILY
Qty: 30 | Refills: 1 | Status: ACTIVE | COMMUNITY
Start: 2024-09-18

## 2024-09-18 NOTE — ASSESSMENT
[FreeTextEntry1] : DM2 -c/w metformin -check a1c -advised to see ophthalmology and podiatry annually  HTN -bp initially elevated but improved on repeat -home BP logs reviewed and within range  -c/w ramipril  HLD -c/w zetia and statin -check lipid panel and cmp  Neck pain -ROM limited in all planes -refractory to PT -ortho referral given -in the interim, discussed conservative measures including stretching, good posture, tylenol/nsaid prn, warm/cool packs  B12 deficiency -on PO b12 -check cbc and b12  HCM -flu vaccine given and tolerated well   RTC in 3-6 months

## 2024-09-18 NOTE — PHYSICAL EXAM
[No Acute Distress] : no acute distress [Well-Appearing] : well-appearing [Normal Sclera/Conjunctiva] : normal sclera/conjunctiva [EOMI] : extraocular movements intact [No Lymphadenopathy] : no lymphadenopathy [Supple] : supple [Thyroid Normal, No Nodules] : the thyroid was normal and there were no nodules present [No Respiratory Distress] : no respiratory distress  [No Accessory Muscle Use] : no accessory muscle use [Clear to Auscultation] : lungs were clear to auscultation bilaterally [Normal Rate] : normal rate  [Regular Rhythm] : with a regular rhythm [Normal S1, S2] : normal S1 and S2 [No Edema] : there was no peripheral edema [No Extremity Clubbing/Cyanosis] : no extremity clubbing/cyanosis [Soft] : abdomen soft [Non Tender] : non-tender [Non-distended] : non-distended [Normal Bowel Sounds] : normal bowel sounds [Normal Affect] : the affect was normal [Normal Insight/Judgement] : insight and judgment were intact [No Spinal Tenderness] : no spinal tenderness [de-identified] : Neck ROM limited in all planes

## 2024-09-18 NOTE — HISTORY OF PRESENT ILLNESS
[FreeTextEntry1] : follow up  [de-identified] : BREE VALIENTE is a 69 year F who presents for follow up PMHx DM, HTN, HLD, LBP  Feels well overall Bp 115/72 (R) 118/64 (L) at home today.  Reports neck pain for years, denies any inciting injury. Has tried PT in the past without relief. Denies numbness/tingling/weakness.   GI Dr. [facility] 1991

## 2024-09-20 LAB
ALBUMIN SERPL ELPH-MCNC: 4.8 G/DL
ALP BLD-CCNC: 82 U/L
ALT SERPL-CCNC: 12 U/L
ANION GAP SERPL CALC-SCNC: 17 MMOL/L
AST SERPL-CCNC: 18 U/L
BILIRUB SERPL-MCNC: 0.3 MG/DL
BUN SERPL-MCNC: 11 MG/DL
CALCIUM SERPL-MCNC: 9.5 MG/DL
CHLORIDE SERPL-SCNC: 103 MMOL/L
CHOLEST SERPL-MCNC: 158 MG/DL
CO2 SERPL-SCNC: 22 MMOL/L
CREAT SERPL-MCNC: 0.66 MG/DL
EGFR: 95 ML/MIN/1.73M2
ESTIMATED AVERAGE GLUCOSE: 134 MG/DL
GLUCOSE SERPL-MCNC: 82 MG/DL
HBA1C MFR BLD HPLC: 6.3 %
HCT VFR BLD CALC: 36.1 %
HDLC SERPL-MCNC: 61 MG/DL
HGB BLD-MCNC: 11.5 G/DL
LDLC SERPL CALC-MCNC: 84 MG/DL
MCHC RBC-ENTMCNC: 26.9 PG
MCHC RBC-ENTMCNC: 31.9 GM/DL
MCV RBC AUTO: 84.5 FL
NONHDLC SERPL-MCNC: 98 MG/DL
PLATELET # BLD AUTO: 267 K/UL
POTASSIUM SERPL-SCNC: 4.6 MMOL/L
PROT SERPL-MCNC: 7.4 G/DL
RBC # BLD: 4.27 M/UL
RBC # FLD: 14 %
SODIUM SERPL-SCNC: 142 MMOL/L
TRIGL SERPL-MCNC: 71 MG/DL
VIT B12 SERPL-MCNC: >2000 PG/ML
WBC # FLD AUTO: 4.37 K/UL

## 2024-10-21 ENCOUNTER — RX RENEWAL (OUTPATIENT)
Age: 69
End: 2024-10-21

## 2024-10-25 ENCOUNTER — RX RENEWAL (OUTPATIENT)
Age: 69
End: 2024-10-25

## 2025-01-23 ENCOUNTER — RX RENEWAL (OUTPATIENT)
Age: 70
End: 2025-01-23

## 2025-01-31 ENCOUNTER — RX RENEWAL (OUTPATIENT)
Age: 70
End: 2025-01-31

## 2025-03-12 ENCOUNTER — APPOINTMENT (OUTPATIENT)
Dept: INTERNAL MEDICINE | Facility: CLINIC | Age: 70
End: 2025-03-12
Payer: MEDICARE

## 2025-03-12 ENCOUNTER — NON-APPOINTMENT (OUTPATIENT)
Age: 70
End: 2025-03-12

## 2025-03-12 VITALS — SYSTOLIC BLOOD PRESSURE: 140 MMHG | DIASTOLIC BLOOD PRESSURE: 88 MMHG

## 2025-03-12 VITALS
HEIGHT: 61 IN | WEIGHT: 146 LBS | BODY MASS INDEX: 27.56 KG/M2 | SYSTOLIC BLOOD PRESSURE: 158 MMHG | DIASTOLIC BLOOD PRESSURE: 84 MMHG | TEMPERATURE: 98.1 F

## 2025-03-12 VITALS — HEART RATE: 73 BPM

## 2025-03-12 DIAGNOSIS — Z00.00 ENCOUNTER FOR GENERAL ADULT MEDICAL EXAMINATION W/OUT ABNORMAL FINDINGS: ICD-10-CM

## 2025-03-12 DIAGNOSIS — I10 ESSENTIAL (PRIMARY) HYPERTENSION: ICD-10-CM

## 2025-03-12 DIAGNOSIS — E11.9 TYPE 2 DIABETES MELLITUS W/OUT COMPLICATIONS: ICD-10-CM

## 2025-03-12 DIAGNOSIS — E78.5 HYPERLIPIDEMIA, UNSPECIFIED: ICD-10-CM

## 2025-03-12 PROCEDURE — 36415 COLL VENOUS BLD VENIPUNCTURE: CPT

## 2025-03-12 PROCEDURE — 99214 OFFICE O/P EST MOD 30 MIN: CPT | Mod: 25

## 2025-03-12 PROCEDURE — G0439: CPT

## 2025-03-12 PROCEDURE — 93000 ELECTROCARDIOGRAM COMPLETE: CPT

## 2025-03-12 PROCEDURE — G2211 COMPLEX E/M VISIT ADD ON: CPT

## 2025-03-14 LAB
25(OH)D3 SERPL-MCNC: 62.7 NG/ML
ALBUMIN SERPL ELPH-MCNC: 4.6 G/DL
ALP BLD-CCNC: 87 U/L
ALT SERPL-CCNC: 12 U/L
ANION GAP SERPL CALC-SCNC: 13 MMOL/L
AST SERPL-CCNC: 18 U/L
BILIRUB SERPL-MCNC: 0.3 MG/DL
BUN SERPL-MCNC: 12 MG/DL
CALCIUM SERPL-MCNC: 9.8 MG/DL
CHLORIDE SERPL-SCNC: 103 MMOL/L
CHOLEST SERPL-MCNC: 147 MG/DL
CO2 SERPL-SCNC: 26 MMOL/L
CREAT SERPL-MCNC: 0.7 MG/DL
EGFRCR SERPLBLD CKD-EPI 2021: 94 ML/MIN/1.73M2
ESTIMATED AVERAGE GLUCOSE: 134 MG/DL
GLUCOSE SERPL-MCNC: 91 MG/DL
HBA1C MFR BLD HPLC: 6.3 %
HCT VFR BLD CALC: 36.9 %
HDLC SERPL-MCNC: 51 MG/DL
HGB BLD-MCNC: 11.6 G/DL
LDLC SERPL CALC-MCNC: 82 MG/DL
MCHC RBC-ENTMCNC: 27.2 PG
MCHC RBC-ENTMCNC: 31.4 G/DL
MCV RBC AUTO: 86.6 FL
NONHDLC SERPL-MCNC: 96 MG/DL
PLATELET # BLD AUTO: 326 K/UL
POTASSIUM SERPL-SCNC: 4.6 MMOL/L
PROT SERPL-MCNC: 7.5 G/DL
RBC # BLD: 4.26 M/UL
RBC # FLD: 14.1 %
SODIUM SERPL-SCNC: 141 MMOL/L
TRIGL SERPL-MCNC: 72 MG/DL
TSH SERPL-ACNC: 1.65 UIU/ML
VIT B12 SERPL-MCNC: 630 PG/ML
WBC # FLD AUTO: 6.36 K/UL

## 2025-04-03 ENCOUNTER — TRANSCRIPTION ENCOUNTER (OUTPATIENT)
Age: 70
End: 2025-04-03

## 2025-04-04 RX ORDER — BLOOD SUGAR DIAGNOSTIC
STRIP MISCELLANEOUS
Qty: 100 | Refills: 1 | Status: ACTIVE | COMMUNITY
Start: 2025-04-04 | End: 1900-01-01

## 2025-06-05 ENCOUNTER — RX RENEWAL (OUTPATIENT)
Age: 70
End: 2025-06-05

## 2025-07-17 ENCOUNTER — RX RENEWAL (OUTPATIENT)
Age: 70
End: 2025-07-17

## 2025-08-28 DIAGNOSIS — Z12.39 ENCOUNTER FOR OTHER SCREENING FOR MALIGNANT NEOPLASM OF BREAST: ICD-10-CM

## 2025-09-09 ENCOUNTER — APPOINTMENT (OUTPATIENT)
Dept: INTERNAL MEDICINE | Facility: CLINIC | Age: 70
End: 2025-09-09
Payer: MEDICARE

## 2025-09-09 VITALS
DIASTOLIC BLOOD PRESSURE: 84 MMHG | TEMPERATURE: 97.8 F | OXYGEN SATURATION: 97 % | SYSTOLIC BLOOD PRESSURE: 145 MMHG | HEART RATE: 100 BPM | WEIGHT: 145 LBS | BODY MASS INDEX: 27.38 KG/M2 | HEIGHT: 61 IN

## 2025-09-09 VITALS — DIASTOLIC BLOOD PRESSURE: 72 MMHG | SYSTOLIC BLOOD PRESSURE: 136 MMHG

## 2025-09-09 DIAGNOSIS — E78.5 HYPERLIPIDEMIA, UNSPECIFIED: ICD-10-CM

## 2025-09-09 DIAGNOSIS — E11.9 TYPE 2 DIABETES MELLITUS W/OUT COMPLICATIONS: ICD-10-CM

## 2025-09-09 DIAGNOSIS — G89.29 CERVICALGIA: ICD-10-CM

## 2025-09-09 DIAGNOSIS — M54.2 CERVICALGIA: ICD-10-CM

## 2025-09-09 DIAGNOSIS — I10 ESSENTIAL (PRIMARY) HYPERTENSION: ICD-10-CM

## 2025-09-09 PROCEDURE — 99214 OFFICE O/P EST MOD 30 MIN: CPT

## 2025-09-09 PROCEDURE — 36415 COLL VENOUS BLD VENIPUNCTURE: CPT

## 2025-09-09 PROCEDURE — G2211 COMPLEX E/M VISIT ADD ON: CPT

## 2025-09-15 LAB
ALBUMIN SERPL ELPH-MCNC: 4.9 G/DL
ALP BLD-CCNC: 86 U/L
ALT SERPL-CCNC: 18 U/L
ANION GAP SERPL CALC-SCNC: 17 MMOL/L
AST SERPL-CCNC: 23 U/L
BILIRUB SERPL-MCNC: 0.4 MG/DL
BUN SERPL-MCNC: 10 MG/DL
CALCIUM SERPL-MCNC: 9.7 MG/DL
CHLORIDE SERPL-SCNC: 105 MMOL/L
CHOLEST SERPL-MCNC: 156 MG/DL
CO2 SERPL-SCNC: 21 MMOL/L
CREAT SERPL-MCNC: 0.65 MG/DL
EGFRCR SERPLBLD CKD-EPI 2021: 95 ML/MIN/1.73M2
ESTIMATED AVERAGE GLUCOSE: 131 MG/DL
GLUCOSE SERPL-MCNC: 95 MG/DL
HBA1C MFR BLD HPLC: 6.2 %
HDLC SERPL-MCNC: 56 MG/DL
LDLC SERPL-MCNC: 83 MG/DL
NONHDLC SERPL-MCNC: 101 MG/DL
POTASSIUM SERPL-SCNC: 4.5 MMOL/L
PROT SERPL-MCNC: 7.6 G/DL
SODIUM SERPL-SCNC: 143 MMOL/L
TRIGL SERPL-MCNC: 93 MG/DL

## 2025-09-16 ENCOUNTER — APPOINTMENT (OUTPATIENT)
Dept: MAMMOGRAPHY | Facility: IMAGING CENTER | Age: 70
End: 2025-09-16
Payer: MEDICARE

## 2025-09-16 ENCOUNTER — RESULT REVIEW (OUTPATIENT)
Age: 70
End: 2025-09-16

## 2025-09-16 PROCEDURE — 77067 SCR MAMMO BI INCL CAD: CPT | Mod: 26

## 2025-09-16 PROCEDURE — 77063 BREAST TOMOSYNTHESIS BI: CPT | Mod: 26
